# Patient Record
Sex: MALE | Race: WHITE | Employment: FULL TIME | URBAN - METROPOLITAN AREA
[De-identification: names, ages, dates, MRNs, and addresses within clinical notes are randomized per-mention and may not be internally consistent; named-entity substitution may affect disease eponyms.]

---

## 2018-08-25 ENCOUNTER — HOSPITAL ENCOUNTER (EMERGENCY)
Age: 38
Discharge: HOME | End: 2018-08-25
Attending: EMERGENCY MEDICINE
Payer: COMMERCIAL

## 2018-08-25 ENCOUNTER — APPOINTMENT (OUTPATIENT)
Dept: GENERAL RADIOLOGY | Age: 38
End: 2018-08-25
Attending: NURSE PRACTITIONER
Payer: COMMERCIAL

## 2018-08-25 VITALS
TEMPERATURE: 98 F | OXYGEN SATURATION: 97 % | WEIGHT: 175 LBS | HEART RATE: 101 BPM | SYSTOLIC BLOOD PRESSURE: 151 MMHG | BODY MASS INDEX: 25.05 KG/M2 | RESPIRATION RATE: 16 BRPM | DIASTOLIC BLOOD PRESSURE: 71 MMHG | HEIGHT: 70 IN

## 2018-08-25 DIAGNOSIS — S61.213A LACERATION OF LEFT MIDDLE FINGER WITHOUT FOREIGN BODY WITHOUT DAMAGE TO NAIL, INITIAL ENCOUNTER: ICD-10-CM

## 2018-08-25 DIAGNOSIS — S61.211A LACERATION OF LEFT INDEX FINGER WITHOUT FOREIGN BODY WITHOUT DAMAGE TO NAIL, INITIAL ENCOUNTER: Primary | ICD-10-CM

## 2018-08-25 PROCEDURE — 90715 TDAP VACCINE 7 YRS/> IM: CPT | Performed by: NURSE PRACTITIONER

## 2018-08-25 PROCEDURE — 77030018836 HC SOL IRR NACL ICUM -A

## 2018-08-25 PROCEDURE — 73130 X-RAY EXAM OF HAND: CPT

## 2018-08-25 PROCEDURE — 75810000293 HC SIMP/SUPERF WND  RPR

## 2018-08-25 PROCEDURE — 99283 EMERGENCY DEPT VISIT LOW MDM: CPT

## 2018-08-25 PROCEDURE — 77030008304 HC SPLNT FNGR ALUM DERY -A

## 2018-08-25 PROCEDURE — 90471 IMMUNIZATION ADMIN: CPT

## 2018-08-25 PROCEDURE — 74011250636 HC RX REV CODE- 250/636: Performed by: NURSE PRACTITIONER

## 2018-08-25 PROCEDURE — 74011250637 HC RX REV CODE- 250/637: Performed by: NURSE PRACTITIONER

## 2018-08-25 PROCEDURE — 74011000250 HC RX REV CODE- 250: Performed by: NURSE PRACTITIONER

## 2018-08-25 PROCEDURE — 77030031132 HC SUT NYL COVD -A

## 2018-08-25 RX ORDER — IBUPROFEN 400 MG/1
800 TABLET ORAL
Status: COMPLETED | OUTPATIENT
Start: 2018-08-25 | End: 2018-08-25

## 2018-08-25 RX ORDER — BUPIVACAINE HYDROCHLORIDE 5 MG/ML
10 INJECTION, SOLUTION EPIDURAL; INTRACAUDAL
Status: COMPLETED | OUTPATIENT
Start: 2018-08-25 | End: 2018-08-25

## 2018-08-25 RX ORDER — CIPROFLOXACIN 500 MG/1
250 TABLET ORAL 2 TIMES DAILY
COMMUNITY
End: 2018-11-05

## 2018-08-25 RX ORDER — IBUPROFEN 600 MG/1
600 TABLET ORAL
Qty: 20 TAB | Refills: 0 | Status: SHIPPED | OUTPATIENT
Start: 2018-08-25 | End: 2018-11-05

## 2018-08-25 RX ORDER — LIDOCAINE HYDROCHLORIDE 10 MG/ML
20 INJECTION INFILTRATION; PERINEURAL ONCE
Status: COMPLETED | OUTPATIENT
Start: 2018-08-25 | End: 2018-08-25

## 2018-08-25 RX ORDER — MOXIFLOXACIN HYDROCHLORIDE 400 MG/1
400 TABLET ORAL DAILY
COMMUNITY
End: 2018-11-05

## 2018-08-25 RX ORDER — ACETAMINOPHEN 500 MG
1000 TABLET ORAL
Qty: 20 TAB | Refills: 0 | Status: SHIPPED | OUTPATIENT
Start: 2018-08-25 | End: 2018-11-05

## 2018-08-25 RX ADMIN — LIDOCAINE HYDROCHLORIDE 2 ML: 10 INJECTION, SOLUTION INFILTRATION; PERINEURAL at 19:21

## 2018-08-25 RX ADMIN — IBUPROFEN 800 MG: 400 TABLET, FILM COATED ORAL at 17:33

## 2018-08-25 RX ADMIN — BACITRACIN ZINC, NEOMYCIN SULFATE, POLYMYXIN B SULFATE 1 PACKET: 3.5; 5000; 4 OINTMENT TOPICAL at 18:29

## 2018-08-25 RX ADMIN — BUPIVACAINE HYDROCHLORIDE 50 MG: 5 INJECTION, SOLUTION EPIDURAL; INTRACAUDAL at 18:00

## 2018-08-25 RX ADMIN — TETANUS TOXOID, REDUCED DIPHTHERIA TOXOID AND ACELLULAR PERTUSSIS VACCINE, ADSORBED 0.5 ML: 5; 2.5; 8; 8; 2.5 SUSPENSION INTRAMUSCULAR at 17:33

## 2018-08-25 NOTE — ED NOTES
patient presents to ED with c/o left hand, 2nd digit pain after cutting it with electrical saw. Bleeding controlled with dressing applied. tdap not up to date. patient also was seen in [facility] ED yesterday for abdominal pain; started taking Moxifloxacin and Ciprofloxacin yesterday. Emergency Department Nursing Plan of Care       The Nursing Plan of Care is developed from the Nursing assessment and Emergency Department Attending provider initial evaluation. The plan of care may be reviewed in the ED Provider note.     The Plan of Care was developed with the following considerations:   Patient / Family readiness to learn indicated by:verbalized understanding  Persons(s) to be included in education: patient  Barriers to Learning/Limitations:No    Signed     Perri Stubbs RN    8/25/2018   5:34 PM

## 2018-08-25 NOTE — DISCHARGE INSTRUCTIONS

## 2018-08-25 NOTE — ED NOTES
Discharge instructions were given to the patient by provider and Edith Edward RN. The patient left the Emergency Department ambulatory, alert and oriented and in no acute distress with 2 prescriptions. The patient was encouraged to call or return to the ED for worsening issues or problems and was encouraged to schedule a follow up appointment for continuing care. The patient verbalized understanding of discharge instructions and prescriptions, all questions were answered. The patient has no further concerns at this time.

## 2018-08-25 NOTE — ED NOTES
Pt educated that sutures need to be removed in 7-10 days. Pt educated on home care and given extra wound care supplies to take home. Pt verbalizes understanding of instructions. Pt given shirt from the donated clothing since his was covered in blood.

## 2018-08-25 NOTE — ED NOTES
Bedside and Verbal shift change report given to Macario Melissa RN (oncoming nurse) by Yancy Louise (offgoing nurse). Report included the following information SBAR.

## 2018-08-26 NOTE — ED PROVIDER NOTES
EMERGENCY DEPARTMENT HISTORY AND PHYSICAL EXAM    Date: 8/25/2018  Patient Name: Ceci Singletary    History of Presenting Illness     Chief Complaint   Patient presents with    Laceration     Pt sts was using and electric saw and it bounce back cutting   2nd and 3rd finger. + bleeding          History Provided By: Patient    Chief Complaint: laceration  Duration: 30 Minutes  Timing:  Acute  Location: left second third fingers  Quality: Aching and Sharp  Severity: 5 out of 10  Modifying Factors: moving fingers worsens pain  Associated Symptoms: denies any other associated signs or symptoms      HPI: Ceci Singletary is a 43 y.o. male with a PMH of No significant past medical history who presents with finger lacerations left second third finger occurred today after cutting fingers on electric saw. Patient states he was wearing protective gloves. Tetanus not up to date    PCP: Aguilar Gallegos MD    Current Outpatient Prescriptions   Medication Sig Dispense Refill    moxifloxacin (AVELOX) 400 mg tablet Take 400 mg by mouth daily.  ciprofloxacin HCl (CIPRO) 500 mg tablet Take 250 mg by mouth two (2) times a day.  ibuprofen (MOTRIN) 600 mg tablet Take 1 Tab by mouth every six (6) hours as needed for Pain. 20 Tab 0    acetaminophen (TYLENOL EXTRA STRENGTH) 500 mg tablet Take 2 Tabs by mouth every six (6) hours as needed for Pain. 20 Tab 0    melatonin 5 mg tab Take  by mouth nightly as needed.  omega-3 fatty acids-vitamin e (FISH OIL) 1,000 mg cap Take 1 capsule by mouth.  alprazolam (XANAX) 1 mg tablet Take 1 tablet by mouth nightly as needed for Sleep (30). 30 tablet 3       Past History     Past Medical History:  History reviewed. No pertinent past medical history. Past Surgical History:  Past Surgical History:   Procedure Laterality Date    HX HERNIA REPAIR      when he was younger       Family History:  History reviewed. No pertinent family history.     Social History:  Social History Substance Use Topics    Smoking status: Never Smoker    Smokeless tobacco: Never Used    Alcohol use No       Allergies:  No Known Allergies      Review of Systems   Review of Systems   Constitutional: Negative for chills, fatigue and fever. Respiratory: Negative for shortness of breath. Cardiovascular: Negative for chest pain. Gastrointestinal: Negative for abdominal pain. Musculoskeletal: Negative for arthralgias, back pain, myalgias, neck pain and neck stiffness. Skin: Positive for wound. Negative for rash. Neurological: Negative for dizziness, light-headedness, numbness and headaches. All other systems reviewed and are negative. Physical Exam     Vitals:    08/25/18 1653   BP: 151/71   Pulse: (!) 101   Resp: 16   Temp: 98 °F (36.7 °C)   SpO2: 97%   Weight: 79.4 kg (175 lb)   Height: 5' 10\" (1.778 m)     Physical Exam   Constitutional: He is oriented to person, place, and time. He appears well-developed and well-nourished. HENT:   Head: Normocephalic and atraumatic. Right Ear: External ear normal.   Mouth/Throat: Oropharynx is clear and moist.   Eyes: Conjunctivae are normal. Right eye exhibits no discharge. Left eye exhibits no discharge. Neck: Normal range of motion. Neck supple. Cardiovascular: Normal rate, regular rhythm and normal heart sounds. Pulmonary/Chest: Effort normal and breath sounds normal. No respiratory distress. He has no wheezes. Abdominal: Soft. Bowel sounds are normal. There is no tenderness. Musculoskeletal: Normal range of motion. He exhibits no edema. Hands:  One cm laceration second and third fingers bleeding controlled DNV intact    Lymphadenopathy:     He has no cervical adenopathy. Neurological: He is alert and oriented to person, place, and time. No cranial nerve deficit. Skin: Skin is warm and dry. Psychiatric: He has a normal mood and affect.  His behavior is normal. Judgment and thought content normal.   Nursing note and vitals reviewed. Diagnostic Study Results     Labs -   No results found for this or any previous visit (from the past 12 hour(s)). Radiologic Studies -   XR HAND LT MIN 3 V   Final Result        CT Results  (Last 48 hours)    None        CXR Results  (Last 48 hours)    None            Medical Decision Making   I am the first provider for this patient. I reviewed the vital signs, available nursing notes, past medical history, past surgical history, family history and social history. Vital Signs-Reviewed the patient's vital signs. Records Reviewed: Nursing Notes    ED Course:   stable  Disposition:  home    DISCHARGE NOTE:         Care plan outlined and precautions discussed. Patient has no new complaints, changes, or physical findings. Results of xray were reviewed with the patient. All medications were reviewed with the patient; will d/c home take motrin tylenol for pain. All of pt's questions and concerns were addressed. Patient was instructed and agrees to follow up with PCP sutures out in ten days. , as well as to return to the ED upon further deterioration. Patient is ready to go home. Follow-up Information     Follow up With Details Comments Contact Info      For suture removal     Emory Davis MD In 2 days If symptoms worsen [facility]  [phone]            Discharge Medication List as of 8/25/2018  7:38 PM      CONTINUE these medications which have NOT CHANGED    Details   moxifloxacin (AVELOX) 400 mg tablet Take 400 mg by mouth daily. , Historical Med      ciprofloxacin HCl (CIPRO) 500 mg tablet Take 250 mg by mouth two (2) times a day., Historical Med      melatonin 5 mg tab Take  by mouth nightly as needed., Historical Med      omega-3 fatty acids-vitamin e (FISH OIL) 1,000 mg cap Take 1 capsule by mouth., Historical Med      alprazolam (XANAX) 1 mg tablet Take 1 tablet by mouth nightly as needed for Sleep (30). , Print, Disp-30 tablet, R-3             Provider Notes (Medical Decision Making):   DDX laceration tendon injury fracture  Procedures:  Wound Repair  Date/Time: 8/25/2018 6:45 PM  Performed by: NPSupervising provider: dr sadler  Preparation: skin prepped with Betadine  Pre-procedure re-eval: Immediately prior to the procedure, the patient was reevaluated and found suitable for the planned procedure and any planned medications. Time out: Immediately prior to the procedure a time out was called to verify the correct patient, procedure, equipment, staff and marking as appropriate. .  Location: left second third fingers one cm each linear lacerations. Wound length:2.5 cm or less (one cm second finger one cm third finger left hand)  Anesthesia: digital block    Anesthesia:  Local Anesthetic: bupivacaine 0.25% without epinephrine  Anesthetic total: 8 mL  Foreign bodies: no foreign bodies  Irrigation solution: saline  Irrigation method: syringe  Debridement: minimal  Skin closure: 5-0 nylon  Number of sutures: 6 (2 sutres second finger 4 sutures third finger)  Technique: simple and interrupted  Approximation: close  Dressing: gauze roll and antibiotic ointment  Patient tolerance: Patient tolerated the procedure well with no immediate complications  My total time at bedside, performing this procedure was 16-30 minutes. Diagnosis     Clinical Impression:   1. Laceration of left index finger without foreign body without damage to nail, initial encounter    2.  Laceration of left middle finger without foreign body without damage to nail, initial encounter

## 2018-11-05 ENCOUNTER — HOSPITAL ENCOUNTER (EMERGENCY)
Age: 38
Discharge: HOME | End: 2018-11-05
Attending: EMERGENCY MEDICINE
Payer: COMMERCIAL

## 2018-11-05 VITALS
SYSTOLIC BLOOD PRESSURE: 130 MMHG | BODY MASS INDEX: 25.4 KG/M2 | RESPIRATION RATE: 18 BRPM | OXYGEN SATURATION: 99 % | WEIGHT: 171.52 LBS | DIASTOLIC BLOOD PRESSURE: 80 MMHG | HEIGHT: 69 IN | TEMPERATURE: 97.5 F | HEART RATE: 74 BPM

## 2018-11-05 DIAGNOSIS — H57.9 SENSATION OF FOREIGN BODY IN EYE: Primary | ICD-10-CM

## 2018-11-05 PROCEDURE — 74011000250 HC RX REV CODE- 250: Performed by: EMERGENCY MEDICINE

## 2018-11-05 PROCEDURE — 99284 EMERGENCY DEPT VISIT MOD MDM: CPT

## 2018-11-05 PROCEDURE — 77030013733

## 2018-11-05 RX ORDER — POLYMYXIN B SULFATE AND TRIMETHOPRIM 1; 10000 MG/ML; [USP'U]/ML
1 SOLUTION OPHTHALMIC 2 TIMES DAILY
Status: DISCONTINUED | OUTPATIENT
Start: 2018-11-05 | End: 2018-11-05

## 2018-11-05 RX ORDER — POLYMYXIN B SULFATE AND TRIMETHOPRIM 1; 10000 MG/ML; [USP'U]/ML
1 SOLUTION OPHTHALMIC 2 TIMES DAILY
Status: DISCONTINUED | OUTPATIENT
Start: 2018-11-05 | End: 2018-11-05 | Stop reason: HOSPADM

## 2018-11-05 RX ORDER — TETRACAINE HYDROCHLORIDE 5 MG/ML
1 SOLUTION OPHTHALMIC
Status: COMPLETED | OUTPATIENT
Start: 2018-11-05 | End: 2018-11-05

## 2018-11-05 RX ORDER — BACITRACIN 500 [USP'U]/G
OINTMENT OPHTHALMIC
Qty: 1 TUBE | Refills: 0 | Status: SHIPPED | OUTPATIENT
Start: 2018-11-05 | End: 2019-06-07

## 2018-11-05 RX ORDER — BACITRACIN 500 [USP'U]/G
OINTMENT OPHTHALMIC
Qty: 1 TUBE | Refills: 0 | Status: SHIPPED | OUTPATIENT
Start: 2018-11-05 | End: 2018-11-05

## 2018-11-05 RX ADMIN — TETRACAINE HYDROCHLORIDE 1 DROP: 5 SOLUTION OPHTHALMIC at 01:01

## 2018-11-05 RX ADMIN — POLYMYXIN B SULFATE AND TRIMETHOPRIM SULFATE 1 DROP: 10000; 1 SOLUTION/ DROPS OPHTHALMIC at 01:36

## 2018-11-05 RX ADMIN — FLUORESCEIN SODIUM 1 STRIP: 1 STRIP OPHTHALMIC at 01:01

## 2018-11-05 NOTE — ED NOTES
Called pharmacy to have trimethoprim drops tubed to ED. Pt states his eye still hurts. Flushed pt eye with saline flush.

## 2018-11-05 NOTE — ED PROVIDER NOTES
EMERGENCY DEPARTMENT HISTORY AND PHYSICAL EXAM 
 
 
Date: 11/5/2018 Patient Name: Ledy Hinton History of Presenting Illness Chief Complaint Patient presents with  Eye Pain History Provided By: Patient HPI: Ledy Hinton is a 43 y.o. male, who presents ambulatory to the ED c/o persistent sore, right eye pain x yesterday evening. Pt states he was been cutting wood outside, and while cleaning up the dust, he felt something fly into his eye. He reports attempting to rinse his eye at home, without any relief of the pain. He denies any changes in his vision. He denies taking any medications to modify his pain. Pt specifically denies any recent fever, chills, nausea, vomiting, diarrhea, abd pain, CP, SOB, lightheadedness, dizziness, numbness, weakness, tingling, BLE swelling, HA, heart palpitations, urinary sxs, changes in BM, changes in PO intake, melena, hematochezia, cough, or congestion. PCP: Ti Kwong MD 
 
PMHx: Significant for none PSHx: Significant for hernia repair Social Hx: -tobacco, -EtOH, -Illicit Drugs There are no other complaints, changes, or physical findings at this time. Current Facility-Administered Medications Medication Dose Route Frequency Provider Last Rate Last Dose  trimethoprim-polymyxin b (POLYTRIM) 10,000 unit- 1 mg/mL ophthalmic solution 1 Drop  1 Drop Right Eye BID Lyndsay Cheema MD      
 
Current Outpatient Medications Medication Sig Dispense Refill  bacitracin ophthalmic ointment Apply thin ribbon to Right eye three times a day 1 Tube 0 Past History Past Medical History: 
History reviewed. No pertinent past medical history. Past Surgical History: 
Past Surgical History:  
Procedure Laterality Date  HX HERNIA REPAIR    
 when he was younger Family History: 
History reviewed. No pertinent family history. Social History: 
Social History Tobacco Use  Smoking status: Never Smoker  Smokeless tobacco: Never Used Substance Use Topics  Alcohol use: No  
 Drug use: No  
 
 
Allergies: 
No Known Allergies Review of Systems Review of Systems Constitutional: Negative for chills and fever. HENT: Negative for congestion, ear pain, rhinorrhea and sore throat. Eyes: Positive for pain. Respiratory: Negative for cough and shortness of breath. Cardiovascular: Negative for chest pain, palpitations and leg swelling. Gastrointestinal: Negative for abdominal pain, constipation, diarrhea, nausea and vomiting. No melena No hematochezia Endocrine: Negative for polyuria. Genitourinary: Negative for dysuria, frequency and hematuria. Neurological: Negative for dizziness, weakness, light-headedness, numbness and headaches. No tingling All other systems reviewed and are negative. Physical Exam  
Physical Exam  
Nursing note and vitals reviewed. General appearance - well nourished, well appearing, and in no distress Eyes - pupils equal and reactive, extraocular eye movements intact ENT - mucous membranes moist, pharynx normal without lesions Neck - supple, no significant adenopathy; non-tender to palpation Chest - clear to auscultation, no wheezes, rales or rhonchi; non-tender to palpation Heart - normal rate and regular rhythm, S1 and S2 normal, no murmurs noted Abdomen - soft, nontender, nondistended, no masses or organomegaly Musculoskeletal - no joint tenderness, deformity or swelling; normal ROM Extremities - peripheral pulses normal, no pedal edema Skin - normal coloration and turgor, no rashes Neurological - alert, oriented x3, normal speech, no focal findings or movement disorder noted Written by Brittany Noble ED Scribe, as dictated by Quinn Armstrong MD 
 
Diagnostic Study Results Labs - No results found for this or any previous visit (from the past 12 hour(s)). Medical Decision Making I am the first provider for this patient. I reviewed the vital signs, available nursing notes, past medical history, past surgical history, family history and social history. Vital Signs-Reviewed the patient's vital signs. Patient Vitals for the past 12 hrs: 
 Temp Pulse Resp BP SpO2  
11/05/18 0008 97.5 °F (36.4 °C) 74 18 133/90 99 % Pulse Oximetry Analysis - 99% on RA Cardiac Monitor:  
Rate: 74bpm 
Rhythm: Normal Sinus Rhythm Records Reviewed: Nursing Notes and Old Medical Records Provider Notes (Medical Decision Making): DDx: foreign body, corneal abrasion, conjunctivitis ED Course:  
Initial assessment performed. The patients presenting problems have been discussed, and they are in agreement with the care plan formulated and outlined with them. I have encouraged them to ask questions as they arise throughout their visit. Procedure Note - Wood's lamp exam: 
12:26 AM 
Performed by: Joe Rivera MD 
Pts right eye was anesthetized with tetracaine, stained with fluorescein, and examined with a Wood's lamp, using lid eversion. Foreign body: no 
Fluorescein uptake: no; conjunctiva markedly ejected, no foreign body seen. The procedure took 1-15 minutes, and pt tolerated well. Progress note: 
1:24 AM 
Pt noted to be feeling better, ready for discharge. Discharge with antibiotic eye drops. Will follow up as instructed. All questions have been answered, pt voiced understanding and agreement with plan. Specific return precautions provided as well as instructions to return to the ED should sx worsen at any time. Vital signs stable for discharge. Written by Mary Kate Rivera ED Scribe, as dictated by Lyndsay Cheema MD 
 
Critical Care Time:  
 
none Disposition: 
 
Discharge Note: 
1:25 AM 
The pt is ready for discharge.  The pt's signs, symptoms, diagnosis, and discharge instructions have been discussed and pt has conveyed their understanding. The pt is to follow up as recommended or return to ER should their symptoms worsen. Plan has been discussed and pt is in agreement. PLAN: 
1. Current Discharge Medication List  
  
START taking these medications Details  
bacitracin ophthalmic ointment Apply thin ribbon to Right eye three times a day 
Qty: 1 Tube, Refills: 0  
  
  
 
2. Follow-up Information Follow up With Specialties Details Why Contact Info Jimmy Saul MD Ophthalmology Schedule an appointment as soon as possible for a visit  [facility] [address] [facility] [place] [address] 
[phone] [facility] EMERGENCY DEPT Emergency Medicine  If symptoms worsen [address] [facility] 
[phone] Return to ED if worse Diagnosis Clinical Impression: 1. Sensation of foreign body in eye Attestations: This note is prepared by Mary Kate Rivera, acting as Scribe for MD Lyndsay Kruse MD : The scribe's documentation has been prepared under my direction and personally reviewed by me in its entirety. I confirm that the note above accurately reflects all work, treatment, procedures, and medical decision making performed by me. This note will not be viewable in [address]

## 2018-11-05 NOTE — ED TRIAGE NOTES
The patient arrives to triage, ambulatory with a steady gait. The patient reports \"cutting some wood, and I was done cutting and I was cleaning up, and I was using the broom and something went into the air and into my eye\". Reports trying to rinse in out without success. Patient reports pain and swelling.

## 2018-11-05 NOTE — DISCHARGE INSTRUCTIONS
Feeling of an Object in the Eye: Care Instructions  Your Care Instructions    Sometimes people feel like there is something in their eye. This is called a foreign body sensation. A doctor may not find anything wrong with your eye. If you had something very small in your eye, like a speck of dirt, tears may have washed it out. Or you may have a small scratch on the surface of the eye (cornea), which can make it feel as if something is still in your eye. The doctor will check your vision and examine your eye. Your eye may be numbed with drops. Sometimes a drop of colored fluid is put in the eye. This lets the doctor have a better view of the surface of the eye. You may get drops to put in your eye after you go home. Or you may just need to watch for a change in your symptoms. Follow-up care is a key part of your treatment and safety. Be sure to make and go to all appointments, and call your doctor if you are having problems. It's also a good idea to know your test results and keep a list of the medicines you take. How can you care for yourself at home? · Do not rub your eye. · If the doctor prescribed eyedrops or ointment, use them as directed. Be sure the dropper or bottle tip is clean. · To put in eyedrops or ointment:  ? Tilt your head back, and pull your lower eyelid down with one finger. ? Drop or squirt the medicine inside the lower lid. ? Close your eye for 30 to 60 seconds to let the drops or ointment move around. ? Do not touch the ointment or dropper tip to your eyelashes or any other surface. When should you call for help? Call your doctor now or seek immediate medical care if:    · You have new or worse eye pain.     · Light hurts your eye.     · You have new or worse redness in your eye.     · You have symptoms of an eye infection, such as:  ? Pus or thick discharge coming from the eye.  ? Redness or swelling around the eye.  ?  A fever.     · You have vision changes.    Watch closely for changes in your health, and be sure to contact your doctor if:    · You do not get better as expected. Where can you learn more? Go to http://kaye-kae.info/. Enter U597 in the search box to learn more about \"Feeling of an Object in the Eye: Care Instructions. \"  Current as of: November 20, 2017  Content Version: 11.8  © 8375-3067 [employer]. Care instructions adapted under license by [employer] (which disclaims liability or warranty for this information). If you have questions about a medical condition or this instruction, always ask your healthcare professional. [place] [address] any warranty or liability for your use of this information.

## 2018-11-05 NOTE — ED NOTES
Dr. Danni Smyth reviewed discharge instructions with the patient. The patient verbalized understanding. All questions and concerns were addressed. The patient declined a wheelchair and is discharged ambulatory in the care of family members with instructions and prescriptions in hand. Pt is alert and oriented x 4. Respirations are clear and unlabored.

## 2019-05-29 ENCOUNTER — TELEPHONE (OUTPATIENT)
Dept: FAMILY MEDICINE CLINIC | Age: 39
End: 2019-05-29

## 2019-06-07 ENCOUNTER — OFFICE VISIT (OUTPATIENT)
Dept: FAMILY MEDICINE CLINIC | Age: 39
End: 2019-06-07

## 2019-06-07 VITALS
TEMPERATURE: 97.2 F | DIASTOLIC BLOOD PRESSURE: 64 MMHG | WEIGHT: 167.2 LBS | RESPIRATION RATE: 14 BRPM | HEART RATE: 70 BPM | SYSTOLIC BLOOD PRESSURE: 106 MMHG | BODY MASS INDEX: 24.76 KG/M2 | OXYGEN SATURATION: 98 % | HEIGHT: 69 IN

## 2019-06-07 DIAGNOSIS — F41.9 ANXIETY: ICD-10-CM

## 2019-06-07 DIAGNOSIS — Z00.00 ENCOUNTER FOR ANNUAL PHYSICAL EXAM: Primary | ICD-10-CM

## 2019-06-07 DIAGNOSIS — D33.4 SCHWANNOMA OF SPINAL CORD (HCC): ICD-10-CM

## 2019-06-07 RX ORDER — VENLAFAXINE HYDROCHLORIDE 37.5 MG/1
37.5 CAPSULE, EXTENDED RELEASE ORAL DAILY
Qty: 30 CAP | Refills: 1 | Status: SHIPPED | OUTPATIENT
Start: 2019-06-07 | End: 2021-03-18 | Stop reason: ALTCHOICE

## 2019-06-07 RX ORDER — ERYTHROMYCIN 5 MG/G
OINTMENT OPHTHALMIC
COMMUNITY
Start: 2019-03-20 | End: 2019-06-07

## 2019-06-07 NOTE — PROGRESS NOTES
Chief Complaint   Patient presents with   [address] [place] [address]     new patient     1. Have you been to the ER, urgent care clinic since your last visit? Hospitalized since your last visit? No  Po[facility]  2 month [facility]    2. Have you seen or consulted any other health care providers outside of the [address] [place] [address] since your last visit? Include any pap smears or colon screening. No     There are no preventive care reminders to display for this patient.

## 2019-06-07 NOTE — PROGRESS NOTES
HISTORY OF PRESENT ILLNESS  Wayne Ledbetter is a 43 y.o. male. HPI  The patient presents today to establish care. Previous PCP was Dr. Jean Cohen. Last office visit several years ago. PMHx is significant for Schwannoma (2014), diverticulosis, anxiety. PSHx is significant for bilateral inguinal hernia repair (1980s), Schwannoma removal (2014). FHx is significant for hyperthyroidism, COPD, bladder cancer. Works as a . Originally from [place], [place]. Schwannoma - found incidentally when he had appendicitis. Sees neurosurgery at [facility] annually for surveillance MRIs; has not been since summer 2018. Had surgery to remove it in 2014. Isn't sure if he plans to go back, as he doesn't want to spend $2000 on the MRIs and it was benign. Currently asymptomatic. Diverticulosis - Had a mild case of diverticulitis in 2018, had a colonoscopy which was normal. Now cutting back on seeds, spicy foods, etc. No further issues. Left inguinal hernia - Reports a small inguinal hernia. Saw surgery in early 2019; plan is for surveillance for now, as it is not currently causing any issues. Current Specialists:  1. Neurosurgery at [facility] - last visit summer 2018, f/u 1 year     Acute complaints today:  1. He reports that he has suffered with anxiety for many years. 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 He is a strong type-A personality, so he feels bad when he is not being fully productive. He has been having difficulty focusing at work, which is problematic, and he'll do something and won't remember it. He will try to overcompensate with laughter and joking, which causes relationship issues. He reports that he has panic attacks; for him, this looks like \"I just shut down and don't want to talk to anyone. \" He thinks that he gets depressed from time to time but not all the time.  He reports that he goes to 0 to 100 very quickly, and will get agitated quickly. He has dealt with this for many years. Now he smokes marijuana and exercises to deal with his anxiety. LINDA score of 16 today. Preventative Care  TDaP: August 2018  Mammogram: has never had  Colonoscopy: 2018  Denies family history of breast cancer in men or early colon CA. Healthy Habits  Patient is exercising several times per week, doing both weight training and cardio. Patient endorses healthy diet; avoids fatty/greasy foods, sugar-sweetened beverages. Denies tobacco use. Social alcohol use. Denies illicit drug use. Smokes marijuana frequently to deal with his anxiety. Sexually active with 3 female partners in last 12 months. Uses condoms for protection; denies concerns for STIs today and declines screening today. He reports that he gets tested regularly. Past Medical History:   Diagnosis Date    Schwannoma of spinal cord ([facility] [address] 2014     Past Surgical History:   Procedure Laterality Date    HX HERNIA REPAIR  [id number]    inguinal hernia repairs (bilateral)    NEUROLOGICAL PROCEDURE UNLISTED  03/2015    Schwanoma removal behind bladder     Family History   Problem Relation Age of Onset    Thyroid Disease Mother         hyperthyroidism    COPD Mother     No Known Problems Father     Cancer Maternal Grandmother         bladder cancer     Social History     Socioeconomic History    Marital status: SINGLE     Spouse name: Not on file    Number of children: Not on file    Years of education: Not on file    Highest education level: Not on file   Tobacco Use    Smoking status: Never Smoker    Smokeless tobacco: Never Used   Substance and Sexual Activity    Alcohol use: Yes     Comment: social    Drug use: Yes     Frequency: 3.0 times per week     Types: Marijuana    Sexual activity: Yes     Partners: Female     Birth control/protection: Condom   Social History Narrative    Engaged, 2 children, 19 and 11. Oldest goes to college in [place]. Works for [address] [place] [address] as a      Patient Active Problem List   Diagnosis Code    Anxiety F41.9    [facility] D33.4     Outpatient Encounter Medications as of 6/7/2019   Medication Sig Dispense Refill    venlafaxine-SR (EFFEXOR-XR) 37.5 mg capsule Take 1 Cap by mouth daily. 30 Cap 1    [DISCONTINUED] erythromycin (ILOTYCIN) ophthalmic ointment       [DISCONTINUED] bacitracin ophthalmic ointment Apply thin ribbon to Right eye three times a day 1 Tube 0     No facility-administered encounter medications on file as of 6/7/2019.       Visit Vitals  /64   Pulse 70   Temp 97.2 °F (36.2 °C) (Oral)   Resp 14   Ht 5' 9\" (1.753 m)   Wt 167 lb 3.2 oz (75.8 kg)   SpO2 98%   BMI 24.69 kg/m²         ROS  Constitutional: denies fevers, chills, fatigue, unintentional weight loss  Skin: denies rashes, itching, concerning/changing lesions  HENT: denies ear pain, hearing loss, sore throat, congestion  Eye: denies eye pain, blurred/double vision, eye discharge/redness  Cardiovascular: denies chest pain, palpitations, peripheral edema, orthopnea, claudication  Pulmonary: denies cough, shortness of breath, wheezing  GI: denies heartburn, nausea, vomiting, diarrhea, constipation, rectal bleeding, abdominal pain  : denies dysuria, hematuria  MS: denies frequent/unexplained falls, persistent myalgias  Neuro: denies headaches, lightheadedness, numbness/tingling, seizures, sensory/strength changes  Psychiatry: denies depression,  insomnia, memory loss; + anxiety    Physical Exam  Visit Vitals  /64   Pulse 70   Temp 97.2 °F (36.2 °C) (Oral)   Resp 14   Ht 5' 9\" (1.753 m)   Wt 167 lb 3.2 oz (75.8 kg)   SpO2 98%   BMI 24.69 kg/m²     Constitutional: alert, oriented, no acute distress  HENT: normocephalic, atraumatic; ears normal bilaterally; nose normal; oropharynx clear and moist  Eyes: PERRL, EOM intact, conjunctivae normal, no discharge  Neck: no thyromegaly, no cervical, submandibular, submental, occipital, supraclavicular lymphadenopathy  Cardiovascular: normal rate, regular rhythm, no murmurs noted; radial, femoral, and pedal pulses normal bilaterally  Pulmonary: lungs clear to auscultation bilaterally with no wheezing, rhonchi, or increased work of breathing  Abdomen: soft, non-tender; + bowel sounds; no distension, masses, or organomegaly noted  : normal testicular and penile exam; no inguinal lymphadenopathy; + very small left inguinal hernia, non-incarcerated  MSK: normal gait, full ROM of all extremities  Neurological: alert, oriented, CN II - XII grossly intact; DTRs 2+ bilaterally  Skin: warm, dry, no rashes or suspicious lesions noted  Psych: mood/affect normal, behavior normal      ASSESSMENT and PLAN    ICD-10-CM ICD-9-CM    1. Encounter for annual physical exam Z00.00 V70.0 CBC W/O DIFF      METABOLIC PANEL, COMPREHENSIVE      LIPID PANEL AND CHOL/HDL RATIO      URINALYSIS W/ RFLX MICROSCOPIC      TSH RFX ON ABNORMAL TO FREE T4   2. Anxiety F41.9 300.00 venlafaxine-SR (EFFEXOR-XR) 37.5 mg capsule      REFERRAL TO PSYCHOLOGY   3. Schwannoma of spinal cord (HCC) D33.4 225.3      1. Establish care - The patient's medications, allergies, and history were all updated today. Will request records from [facility] neurosurgery. 2. CPE - Normal CPE today with exception of very mild left inguinal hernia. Agree with watchful waiting approach. Check basic labs today and discuss at follow up visit. 3. Anxiety - LINDA score of 16 today. We discussed therapeutic options, and he elects to trial effexor 37.5mg daily and see a counselor. The medicatoin was prescribed, timeline during which to expect improvement in symptoms was discussed, and he was referred to psychology with a list of possible counselors and encouraged to schedule his own appointment at his earliest convenience.  We did discuss that neither medication nor counseling would lessen the stresses of his life, which sounds very full, but could help him develop priorities. He agrees with this. 4. Schwannoma - Will pull records from [facility] neurosurgery and discuss follow up at his next visit. Return in 4 - 6 weeks for medication follow-up, sooner PRN. Follow-up and Dispositions    · Return in about 6 weeks (around 7/19/2019) for lab/med follow up.

## 2019-06-08 LAB
ALBUMIN SERPL-MCNC: 5.1 G/DL (ref 3.5–5.5)
ALBUMIN/GLOB SERPL: 2.1 {RATIO} (ref 1.2–2.2)
ALP SERPL-CCNC: 55 IU/L (ref 39–117)
ALT SERPL-CCNC: 20 IU/L (ref 0–44)
APPEARANCE UR: CLEAR
AST SERPL-CCNC: 19 IU/L (ref 0–40)
BILIRUB SERPL-MCNC: 0.7 MG/DL (ref 0–1.2)
BILIRUB UR QL STRIP: NEGATIVE
BUN SERPL-MCNC: 12 MG/DL (ref 6–20)
BUN/CREAT SERPL: 15 (ref 9–20)
CALCIUM SERPL-MCNC: 9.7 MG/DL (ref 8.7–10.2)
CHLORIDE SERPL-SCNC: 101 MMOL/L (ref 96–106)
CHOLEST SERPL-MCNC: 172 MG/DL (ref 100–199)
CHOLEST/HDLC SERPL: 2.2 RATIO (ref 0–5)
CO2 SERPL-SCNC: 26 MMOL/L (ref 20–29)
COLOR UR: YELLOW
CREAT SERPL-MCNC: 0.82 MG/DL (ref 0.76–1.27)
ERYTHROCYTE [DISTWIDTH] IN BLOOD BY AUTOMATED COUNT: 13.3 % (ref 12.3–15.4)
GLOBULIN SER CALC-MCNC: 2.4 G/DL (ref 1.5–4.5)
GLUCOSE SERPL-MCNC: 75 MG/DL (ref 65–99)
GLUCOSE UR QL: NEGATIVE
HCT VFR BLD AUTO: 46 % (ref 37.5–51)
HDLC SERPL-MCNC: 79 MG/DL
HGB BLD-MCNC: 15.7 G/DL (ref 13–17.7)
HGB UR QL STRIP: NEGATIVE
INTERPRETATION, 910389: NORMAL
KETONES UR QL STRIP: NEGATIVE
LDLC SERPL CALC-MCNC: 85 MG/DL (ref 0–99)
LEUKOCYTE ESTERASE UR QL STRIP: NEGATIVE
MCH RBC QN AUTO: 31.2 PG (ref 26.6–33)
MCHC RBC AUTO-ENTMCNC: 34.1 G/DL (ref 31.5–35.7)
MCV RBC AUTO: 91 FL (ref 79–97)
MICRO URNS: NORMAL
NITRITE UR QL STRIP: NEGATIVE
PH UR STRIP: 7.5 [PH] (ref 5–7.5)
PLATELET # BLD AUTO: 230 X10E3/UL (ref 150–450)
POTASSIUM SERPL-SCNC: 4.3 MMOL/L (ref 3.5–5.2)
PROT SERPL-MCNC: 7.5 G/DL (ref 6–8.5)
PROT UR QL STRIP: NEGATIVE
RBC # BLD AUTO: 5.04 X10E6/UL (ref 4.14–5.8)
SODIUM SERPL-SCNC: 142 MMOL/L (ref 134–144)
SP GR UR: 1.02 (ref 1–1.03)
TRIGL SERPL-MCNC: 39 MG/DL (ref 0–149)
TSH SERPL DL<=0.005 MIU/L-ACNC: 1.15 UIU/ML (ref 0.45–4.5)
UROBILINOGEN UR STRIP-MCNC: 0.2 MG/DL (ref 0.2–1)
VLDLC SERPL CALC-MCNC: 8 MG/DL (ref 5–40)
WBC # BLD AUTO: 4 X10E3/UL (ref 3.4–10.8)

## 2019-07-22 ENCOUNTER — TELEPHONE (OUTPATIENT)
Dept: FAMILY MEDICINE CLINIC | Age: 39
End: 2019-07-22
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
## 2019-07-22 NOTE — TELEPHONE ENCOUNTER
Patient contacted to let know no availability at this time for 7/29. Patient suggested to check daily for possible cancellation or just reschedule to next available appointment.

## 2021-03-18 ENCOUNTER — TELEPHONE (OUTPATIENT)
Dept: FAMILY MEDICINE CLINIC | Age: 41
End: 2021-03-18

## 2021-03-18 ENCOUNTER — OFFICE VISIT (OUTPATIENT)
Dept: FAMILY MEDICINE CLINIC | Age: 41
End: 2021-03-18

## 2021-03-18 VITALS
DIASTOLIC BLOOD PRESSURE: 74 MMHG | WEIGHT: 175 LBS | OXYGEN SATURATION: 98 % | SYSTOLIC BLOOD PRESSURE: 120 MMHG | HEART RATE: 88 BPM | BODY MASS INDEX: 25.05 KG/M2 | RESPIRATION RATE: 14 BRPM | TEMPERATURE: 97.9 F | HEIGHT: 70 IN

## 2021-03-18 DIAGNOSIS — R10.30 LOWER ABDOMINAL PAIN: Primary | ICD-10-CM

## 2021-03-18 PROCEDURE — 99214 OFFICE O/P EST MOD 30 MIN: CPT | Performed by: STUDENT IN AN ORGANIZED HEALTH CARE EDUCATION/TRAINING PROGRAM

## 2021-03-18 RX ORDER — AMOXICILLIN AND CLAVULANATE POTASSIUM 875; 125 MG/1; MG/1
1 TABLET, FILM COATED ORAL 2 TIMES DAILY
Qty: 10 TAB | Refills: 0 | Status: SHIPPED | OUTPATIENT
Start: 2021-03-18 | End: 2021-03-23

## 2021-03-18 RX ORDER — POLYETHYLENE GLYCOL 3350 17 G/17G
17 POWDER, FOR SOLUTION ORAL
Qty: 30 EACH | Refills: 0 | Status: SHIPPED | OUTPATIENT
Start: 2021-03-18 | End: 2022-06-24

## 2021-03-18 NOTE — PROGRESS NOTES
[address] [address] SARITHA Chandra. [address]  [phone]    C/C: Lower Abdominal Pain     Sally Russell is a 38 y.o. male who presents with complaint of;    Abdominal pain:    Lower abd pain ongoing for about 4 days. Has been having small bowel movements but states that he would have to sit for a little longer. Stools are hard. The pain is along the left suprapubic region. Denies any  rectal bleeding, weight loss, dysphagia, early satiety, blood in stool, blood in the urine, flank pain, fever, chills, nausea, vomiting. Had a schwanoma s/p surgery in 2014. Also s/p appendectomy. PMHx is significant for Schwannoma (2014), diverticulosis, anxiety. PSHx is significant for bilateral inguinal hernia repair (1980s), Schwannoma removal (2014). Had a mild case of diverticulitis in 2018, had a colonoscopy which was normal. Was asked to cut back on seeds, spicy foods, etc.    Allergies- reviewed:   No Known Allergies    Medications- reviewed:   Current Outpatient Medications   Medication Sig    amoxicillin-clavulanate (AUGMENTIN) 875-125 mg per tablet Take 1 Tab by mouth two (2) times a day for 5 days. TAKE IF YOU START HAVING SYSTEMIC SYMPTOMS AS DISCUSSED IN CLINIC    polyethylene glycol (MIRALAX) 17 gram packet Take 1 Packet by mouth daily as needed for Constipation. STOP when you start having loose stools     No current facility-administered medications for this visit.         Past Medical History- reviewed:  Past Medical History:   Diagnosis Date    Schwannoma of spinal cord ([facility] [address] 2014       Family History - reviewed:  Family History   Problem Relation Age of Onset    Thyroid Disease Mother         hyperthyroidism    COPD Mother     No Known Problems Father     Cancer Maternal Grandmother         bladder cancer       Social History - reviewed:  Social History     Socioeconomic History    Marital status: SINGLE     Spouse name: Not on file    Number of children: Not on file    Years of education: Not on file    Highest education level: Not on file   Occupational History    Not on file   Social Needs    Financial resource strain: Not on file    Food insecurity     Worry: Not on file     Inability: Not on file    Transportation needs     Medical: Not on file     Non-medical: Not on file   Tobacco Use    Smoking status: Never Smoker    Smokeless tobacco: Never Used   Substance and Sexual Activity    Alcohol use: Yes     Comment: social    Drug use: Yes     Frequency: 3.0 times per week     Types: Marijuana    Sexual activity: Yes     Partners: Female     Birth control/protection: Condom   Lifestyle    Physical activity     Days per week: Not on file     Minutes per session: Not on file    Stress: Not on file   Relationships    Social connections     Talks on phone: Not on file     Gets together: Not on file     Attends Gnosticist service: Not on file     Active member of club or organization: Not on file     Attends meetings of clubs or organizations: Not on file     Relationship status: Not on file    Intimate partner violence     Fear of current or ex partner: Not on file     Emotionally abused: Not on file     Physically abused: Not on file     Forced sexual activity: Not on file   Other Topics Concern    Not on file   Social History Narrative    Engaged, 2 children, 23 and 6. Oldest goes to college in [place]. Works for [address] [place] [address] as a        Review of Systems:    General/Constitutional:  No fever, chills, sweats, fatigue, night sweats, weakness, weight loss or weight gain   Head: No headache, no trauma   Neck: No swelling, masses, stiffness, pain, or limited movement   Cardiac: No chest pain   Respiratory: No cough, shortness of breath, or dyspnea on exertion   GI: Positive for abdominal pain. No incontinence. No nausea/vomiting, bloody or dark stools   : No incontinence. No change in urinary habits.       Objective: Visit Vitals  /74 (BP 1 Location: Right upper arm, BP Patient Position: Sitting)   Pulse 88   Temp 97.9 °F (36.6 °C)   Resp 14   Ht 5' 10\" (1.778 m)   Wt 175 lb (79.4 kg)   SpO2 98%   BMI 25.11 kg/m²       General: Alert and oriented and in no acute distress. LUNGS: Clear to auscultation bilaterally, no wheezes, rales and rhonchi. CARDIOVASCULAR: Regular rate and rhythm without murmurs, gallops or rubs. ABDOMEN: soft without tenderness, guarding, mass or rebound. No CVA tenderness or inguinal adenopathy noted. Well healed surgical scars in the periumbilical region. NEUROLOGIC: Speech intact; face symmetrical; moves all extremities equally. SKIN: No rash:       Assessment/Plan:       ICD-10-CM ICD-9-CM    1. Lower abdominal pain  R10.30 789.09 XR ABD (AP AND ERECT OR DECUB)      amoxicillin-clavulanate (AUGMENTIN) 875-125 mg per tablet      polyethylene glycol (MIRALAX) 17 gram packet       1. Lower abdominal pain  Unclear etiology but suggest constipation to be the culprit. Unlikely to be diverticulitis given clinical presentation and exam. Vitals are stable. Has history of bilateral inguinal hernia repair (1980s), Schwannoma removal (2014) and appendectomy. No concerned about ischemic colitis or obstruction. - XR ABD (AP AND ERECT OR DECUB); Future  - Pocket prescription for amoxicillin-clavulanate (AUGMENTIN) 875-125 mg per tablet to take if he develops any diverticulitis symptoms; Take 1 Tab by mouth two (2) times a day for 5 days. TAKE IF YOU START HAVING SYSTEMIC SYMPTOMS AS DISCUSSED IN CLINIC   - Trial of polyethylene glycol (MIRALAX) 17 gram packet; Take 1 Packet by mouth daily as needed for Constipation. STOP when you start having loose stools     Follow up: 1-2 weeks . RTC to clinic sooner should symptoms persist, worsen or fail to improve as anticipated. We discussed the expected course, resolution and complications of the diagnosis(es) in detail.   Medication risks, benefits, costs, interactions, and alternatives were discussed as indicated. I advised to contact the office if his condition worsens, changes or fails to improve as anticipated. Pt expressed understanding with the diagnosis(es) and plan. Patient understands that this encounter was a temporary measure, and the importance of further follow up and examination was emphasized. Patient verbalized understanding.       Signed By: Killian Hernadez MD     March 18, 2021

## 2021-03-18 NOTE — TELEPHONE ENCOUNTER
Patient is calling about a rx that was supposed to be called in. He was in this morning. Please call @[phone].

## 2021-03-18 NOTE — TELEPHONE ENCOUNTER
Returned patient call explained that pharmacy did received and acknowledge receipt of medication. Advised patient to call pharmacy before picking up to see if script. Is filled.

## 2021-03-29 ENCOUNTER — OFFICE VISIT (OUTPATIENT)
Dept: FAMILY MEDICINE CLINIC | Age: 41
End: 2021-03-29
Payer: COMMERCIAL

## 2021-03-29 VITALS
OXYGEN SATURATION: 96 % | DIASTOLIC BLOOD PRESSURE: 80 MMHG | BODY MASS INDEX: 24.57 KG/M2 | HEART RATE: 64 BPM | RESPIRATION RATE: 16 BRPM | WEIGHT: 171.6 LBS | TEMPERATURE: 97.8 F | SYSTOLIC BLOOD PRESSURE: 123 MMHG | HEIGHT: 70 IN

## 2021-03-29 DIAGNOSIS — R10.30 LOWER ABDOMINAL PAIN: Primary | ICD-10-CM

## 2021-03-29 DIAGNOSIS — Z91.018 FOOD ALLERGY: ICD-10-CM

## 2021-03-29 DIAGNOSIS — K40.90 LEFT INGUINAL HERNIA: ICD-10-CM

## 2021-03-29 PROCEDURE — 99213 OFFICE O/P EST LOW 20 MIN: CPT | Performed by: STUDENT IN AN ORGANIZED HEALTH CARE EDUCATION/TRAINING PROGRAM

## 2021-03-29 NOTE — PATIENT INSTRUCTIONS
Allergy and Immunology (Adult)       [facility]: Gm Rivas [place], [facility]     [phone]    [facility] [address]

## 2021-03-29 NOTE — PROGRESS NOTES
Name and  Verified. Chief Complaint   Patient presents with    Follow-up     Stomach pain     Patient would like referral to allergist. Had ate some oats. Experienced throatt and tongue swelling and took some Benadryl and symptoms went away. Also would like referral to have hernia checked out. 1. Have you been to the ER, urgent care clinic since your last visit? Hospitalized since your last visit? No      2. Have you seen or consulted any other health care providers outside of the [address] [place] [address] since your last visit? Include any pap smears or colon screening.  No

## 2021-03-29 NOTE — PROGRESS NOTES
[address]  [address] [place], [address] [place] [address]  [phone]    C/C: Abdominal Pain and inguinal hernia    Piotr Bautista is a 38 y.o. male with a history of schwanoma s/p surgery (2014) and s/p appendectomy who presents for follow up of abd pain;    Abdominal pain:    Saw pt 10 days ago with complains of low abd pain. Had abd xray which showed Mild to moderate fecal retention in the large bowel. Recommended Miralax and also gave pocket script for Augmentin due to concerns for possible diverticulitis should he devp symptoms given his history. The pain is mainly along the left suprapubic regio without radiation. Interval history: Pt reports that he is feeling much better. No more abd pain. Pt think he figured his symptoms were due to an allergy to oat milk. States that symptoms completely resolved 3 days ago after he stopped drinking Oat milk. Also recalls that his initial symptoms started few days after he started consuming oat milk after switching from dairy which he also seem to be allergic to. Pt would like referral to allergies to figure out what other food/milk allergies he might have. Denies any  rectal bleeding, weight loss, dysphagia, early satiety, blood in stool, blood in the urine, flank pain, fever, chills, nausea, vomiting. Left inguinal hernia - Reports a small inguinal hernia present for about 2 years. Saw surgery in early 2019; plan is for surveillance for now, as it is not currently causing any issues. Pt states that he is still without symptoms but it limits him from doing certain things. He would like it surgically addressed. PMHx is significant for Schwannoma (2014), diverticulosis, anxiety. PSHx is significant for bilateral inguinal hernia repair (1980s), Schwannoma removal (2014).   Had a mild case of diverticulitis in 2018, had a colonoscopy which was normal. Was asked to cut back on seeds, spicy foods, etc.    Allergies- reviewed:   No Known Allergies    Medications- reviewed:   Current Outpatient Medications   Medication Sig    polyethylene glycol (MIRALAX) 17 gram packet Take 1 Packet by mouth daily as needed for Constipation. STOP when you start having loose stools     No current facility-administered medications for this visit.         Past Medical History- reviewed:  Past Medical History:   Diagnosis Date    Schwannoma of spinal cord ([facility] [address] 2014       Family History - reviewed:  Family History   Problem Relation Age of Onset    Thyroid Disease Mother         hyperthyroidism    COPD Mother     No Known Problems Father     Cancer Maternal Grandmother         bladder cancer       Social History - reviewed:  Social History     Socioeconomic History    Marital status: SINGLE     Spouse name: Not on file    Number of children: Not on file    Years of education: Not on file    Highest education level: Not on file   Occupational History    Not on file   Social Needs    Financial resource strain: Not on file    Food insecurity     Worry: Not on file     Inability: Not on file    Transportation needs     Medical: Not on file     Non-medical: Not on file   Tobacco Use    Smoking status: Never Smoker    Smokeless tobacco: Never Used   Substance and Sexual Activity    Alcohol use: Yes     Comment: social    Drug use: Yes     Frequency: 3.0 times per week     Types: Marijuana    Sexual activity: Yes     Partners: Female     Birth control/protection: Condom   Lifestyle    Physical activity     Days per week: Not on file     Minutes per session: Not on file    Stress: Not on file   Relationships    Social connections     Talks on phone: Not on file     Gets together: Not on file     Attends Gnosticist service: Not on file     Active member of club or organization: Not on file     Attends meetings of clubs or organizations: Not on file     Relationship status: Not on file    Intimate partner violence     Fear of current or ex partner: Not on file     Emotionally abused: Not on file     Physically abused: Not on file     Forced sexual activity: Not on file   Other Topics Concern    Not on file   Social History Narrative    Engaged, 2 children, 23 and 11. Oldest goes to college in [place]. Works for [address] [place] [address] as a        Review of Systems:    General/Constitutional:  No fever, chills, sweats, fatigue, night sweats, weakness, weight loss or weight gain   Head: No headache, no trauma   Neck: No swelling, masses, stiffness, pain, or limited movement   Cardiac: No chest pain   Respiratory: No cough, shortness of breath, or dyspnea on exertion   GI: as per HPI  :As per HPI      Objective:     Visit Vitals  /80 (BP 1 Location: Right arm, BP Patient Position: Sitting, BP Cuff Size: Adult)   Pulse 64   Temp 97.8 °F (36.6 °C) (Temporal)   Resp 16   Ht 5' 10\" (1.778 m)   Wt 171 lb 9.6 oz (77.8 kg)   SpO2 96%   BMI 24.62 kg/m²       General: Alert and oriented and in no acute distress. LUNGS: Clear to auscultation bilaterally, no wheezes, rales and rhonchi. CARDIOVASCULAR: Regular rate and rhythm without murmurs, gallops or rubs. ABDOMEN: soft without tenderness, guarding, mass or rebound. No CVA tenderness or inguinal adenopathy noted. Well healed surgical scars in the periumbilical region. : Reducible left inguinal hernia. No pain or redness. Assessment/Plan:       ICD-10-CM ICD-9-CM    1. Lower abdominal pain  R10.30 789.09    2. Food allergy  Z91.018 V15.05 REFERRAL TO ALLERGY   3. Left inguinal hernia  K40.90 550.90 REFERRAL TO GENERAL SURGERY       1. Lower abdominal pain  Likely related to milk allergy. Currently without any symptoms.   - Referred to allergies for food allergy testing    2. Left inguinal hernia  Currently presents without any symptoms.  Would like patient to see Gen surgery for evaluation as he still wants to have it taken care of.    - [facility]  - Warning signs for incarceration and strangulation discussed with patient. Advised to go to ER immediately should he experience any    Follow up: For yearly visits. RTC sooner if  Needed    On this date 03/29/21 I have spent 25 minutes reviewing previous notes, test results and face to face with the patient discussing the diagnosis and importance of compliance with the treatment plan as well as documenting on the day of the visit. We discussed the expected course, resolution and complications of the diagnosis(es) in detail. Medication risks, benefits, costs, interactions, and alternatives were discussed as indicated. I advised to contact the office if his condition worsens, changes or fails to improve as anticipated. Pt expressed understanding with the diagnosis(es) and plan. Patient understands that this encounter was a temporary measure, and the importance of further follow up and examination was emphasized. Patient verbalized understanding.       Signed By: Kush Johnson MD     March 29, 2021

## 2021-04-12 ENCOUNTER — OFFICE VISIT (OUTPATIENT)
Dept: SURGERY | Age: 41
End: 2021-04-12
Payer: COMMERCIAL

## 2021-04-12 VITALS
WEIGHT: 175.3 LBS | HEIGHT: 70 IN | SYSTOLIC BLOOD PRESSURE: 114 MMHG | HEART RATE: 71 BPM | OXYGEN SATURATION: 97 % | BODY MASS INDEX: 25.09 KG/M2 | TEMPERATURE: 97.5 F | DIASTOLIC BLOOD PRESSURE: 67 MMHG | RESPIRATION RATE: 16 BRPM

## 2021-04-12 DIAGNOSIS — K40.91 RECURRENT INGUINAL HERNIA WITHOUT OBSTRUCTION OR GANGRENE, UNSPECIFIED LATERALITY: Primary | ICD-10-CM

## 2021-04-12 PROCEDURE — 99205 OFFICE O/P NEW HI 60 MIN: CPT | Performed by: SURGERY

## 2021-04-12 NOTE — H&P (VIEW-ONLY)
HISTORY OF PRESENT ILLNESS  Deanna Mae is a 38 y.o. male. occ discomfort    Trouble with constipation    Prior bilateral IHR as an infant    New on the left    Tried MIralax  Now regular        ____________________________________________________________________________  Patient presents with:  New Patient: referred by Mario Rose for eval inguinal hernia    /67 (BP 1 Location: Left upper arm, BP Patient Position: Sitting, BP Cuff Size: Small infant)   Pulse 71   Temp 97.5 °F (36.4 °C) (Temporal)   Resp 16   Ht 5' 10\" (1.778 m)   Wt 79.5 kg (175 lb 4.8 oz)   SpO2 97%   BMI 25.15 kg/m²   Past Medical History:  2014: Schwannoma of spinal cord [facility])  Past Surgical History:  3464,5167: HX HERNIA REPAIR      Comment:  inguinal hernia repairs (bilateral)  03/2015: NEUROLOGICAL PROCEDURE UNLISTED      Comment:  Schwanoma removal behind bladder  Social History    Socioeconomic History      Marital status: SINGLE      Spouse name: Not on file      Number of children: Not on file      Years of education: Not on file      Highest education level: Not on file    Tobacco Use      Smoking status: Never Smoker      Smokeless tobacco: Never Used    Substance and Sexual Activity      Alcohol use: Yes        Comment: social      Drug use: Yes        Frequency: 3.0 times per week        Types: Marijuana      Sexual activity: Yes        Partners: Female        Birth control/protection: Condom    Social History Narrative      Engaged, 2 children, 19 and 11. Oldest goes to college in [place].  Works for [employer] as a     Review of patient's family history indicates:  Problem: Thyroid Disease      Relation: Mother          Age of Onset: (Not Specified)          Comment: hyperthyroidism  Problem: COPD      Relation: Mother          Age of Onset: (Not Specified)  Problem: No Known Problems      Relation: Father          Age of Onset: (Not Specified)  Problem: Cancer      Relation: Maternal Grandmother          Age of Onset: (Not Specified)          Comment: bladder cancer    Current Outpatient Medications:  polyethylene glycol (MIRALAX) 17 gram packet, Take 1 Packet by mouth daily as needed for Constipation. STOP when you start having loose stools    No current facility-administered medications for this visit. Allergies: No Known Allergies  _____________________________________________________________________________      Possible Hernia  The history is provided by the patient. This is a new problem. The current episode started more than 1 week ago. The problem occurs daily. The problem has been gradually worsening. Pertinent negatives include no chest pain, no abdominal pain, no headaches and no shortness of breath. The symptoms are aggravated by exertion. The symptoms are relieved by rest. The treatment provided no relief. Review of Systems   Constitutional: Negative for chills, fever and weight loss. HENT: Negative for ear pain. Eyes: Negative for pain. Respiratory: Negative for shortness of breath. Cardiovascular: Negative for chest pain. Gastrointestinal: Negative for abdominal pain and blood in stool. Genitourinary: Negative for hematuria. Musculoskeletal: Negative for joint pain. Skin: Negative for rash. Neurological: Negative for dizziness, focal weakness, seizures and headaches. Endo/Heme/Allergies: Does not bruise/bleed easily. Psychiatric/Behavioral: The patient does not have insomnia. Physical Exam  Constitutional:       General: He is not in acute distress. Appearance: He is well-developed. HENT:      Head: Normocephalic. Mouth/Throat:      Pharynx: No oropharyngeal exudate. Eyes:      General: No scleral icterus. Pupils: Pupils are equal, round, and reactive to light. Neck:      Musculoskeletal: Neck supple. Trachea: No tracheal deviation. Cardiovascular:      Rate and Rhythm: Normal rate and regular rhythm.       Heart sounds: Normal heart sounds. Pulmonary:      Effort: Pulmonary effort is normal.      Breath sounds: Normal breath sounds. No wheezing. Abdominal:      General: There is no distension. Palpations: Abdomen is soft. There is no mass. Tenderness: There is no abdominal tenderness. Hernia: A hernia is present. Hernia is present in the left inguinal area. Musculoskeletal: Normal range of motion. Lymphadenopathy:      Cervical: No cervical adenopathy. Skin:     General: Skin is warm. Findings: No erythema or rash. Neurological:      Mental Status: He is alert and oriented to person, place, and time. Psychiatric:         Behavior: Behavior normal.         ASSESSMENT and PLAN    ICD-10-CM ICD-9-CM    1. Recurrent inguinal hernia without obstruction or gangrene, unspecified laterality  K40.91 550.91       New hernia on the left      Akila Goddard is having symptoms. I have recommended to him that we proceed with surgery. I had an extensive discussion with him regarding the risks, benefits, and alternatives of proceeding with a Laparoscopic Left, Possible Bilateral Inguinal Hernia Repair with Mesh, Robot Assisted. Risks,benefits, and alternatives were discussed including the risk of anesthesia, bleeding, infection, including mesh infection, chronic orchialgia, neuralgia, other pain syndromes, testicular ischemia, conversion to open, injury to bowel, and recurrence were discussed. The patient was counseled at length about the risks of gautam Covid-19 during their perioperative period and any recovery window from their procedure. He was made aware that gautam Covid-19  may worsen their prognosis for recovering from their procedure and lend to a higher morbidity and/or mortality risk. All material risks, benefits, and reasonable alternatives including postponing the procedure were discussed. He wishes to proceed with the procedure at this time.     He is in agreement to proceed. All questions were answered. Roel Grimaldo will undergo preoperative evaluation and will proceed provided he is medically stable. We will schedule him at his earliest convenience. Thank you for this consult. I had an extensive and thorough discussion with Roel Grimaldo regarding current diagnosis and treatment recommendations. Total time spend with him was 60 minutes.   This included the following:  preparing to see the patient (reviewing prior records and tests),  performing a medically appropriate examination and/or evaluation,  counseling and educating the patient/family/caregiver,  documenting clinical information in the electronic or other health record,  independently interpreting results and communicating results to the patient/family/caregiver,  care coordination

## 2021-04-12 NOTE — PROGRESS NOTES
HISTORY OF PRESENT ILLNESS  Linnette Rodriguez is a 38 y.o. male. occ discomfort    Trouble with constipation    Prior bilateral IHR as an infant    New on the left    Tried MIralax  Now regular        ____________________________________________________________________________  Patient presents with:  New Patient: referred by Slick Mayfield for eval inguinal hernia    /67 (BP 1 Location: Left upper arm, BP Patient Position: Sitting, BP Cuff Size: Small infant)   Pulse 71   Temp 97.5 °F (36.4 °C) (Temporal)   Resp 16   Ht 5' 10\" (1.778 m)   Wt 79.5 kg (175 lb 4.8 oz)   SpO2 97%   BMI 25.15 kg/m²   Past Medical History:  2014: Schwannoma of spinal cord [facility])  Past Surgical History:  6595,2482: HX HERNIA REPAIR      Comment:  inguinal hernia repairs (bilateral)  03/2015: NEUROLOGICAL PROCEDURE UNLISTED      Comment:  Schwanoma removal behind bladder  Social History    Socioeconomic History      Marital status: SINGLE      Spouse name: Not on file      Number of children: Not on file      Years of education: Not on file      Highest education level: Not on file    Tobacco Use      Smoking status: Never Smoker      Smokeless tobacco: Never Used    Substance and Sexual Activity      Alcohol use: Yes        Comment: social      Drug use: Yes        Frequency: 3.0 times per week        Types: Marijuana      Sexual activity: Yes        Partners: Female        Birth control/protection: Condom    Social History Narrative      Engaged, 2 children, 19 and 11. Oldest goes to college in [place].  Works for [employer] as a     Review of patient's family history indicates:  Problem: Thyroid Disease      Relation: Mother          Age of Onset: (Not Specified)          Comment: hyperthyroidism  Problem: COPD      Relation: Mother          Age of Onset: (Not Specified)  Problem: No Known Problems      Relation: Father          Age of Onset: (Not Specified)  Problem: Cancer      Relation: Maternal Grandmother          Age of Onset: (Not Specified)          Comment: bladder cancer    Current Outpatient Medications:  polyethylene glycol (MIRALAX) 17 gram packet, Take 1 Packet by mouth daily as needed for Constipation. STOP when you start having loose stools    No current facility-administered medications for this visit. Allergies: No Known Allergies  _____________________________________________________________________________      Possible Hernia  The history is provided by the patient. This is a new problem. The current episode started more than 1 week ago. The problem occurs daily. The problem has been gradually worsening. Pertinent negatives include no chest pain, no abdominal pain, no headaches and no shortness of breath. The symptoms are aggravated by exertion. The symptoms are relieved by rest. The treatment provided no relief. Review of Systems   Constitutional: Negative for chills, fever and weight loss. HENT: Negative for ear pain. Eyes: Negative for pain. Respiratory: Negative for shortness of breath. Cardiovascular: Negative for chest pain. Gastrointestinal: Negative for abdominal pain and blood in stool. Genitourinary: Negative for hematuria. Musculoskeletal: Negative for joint pain. Skin: Negative for rash. Neurological: Negative for dizziness, focal weakness, seizures and headaches. Endo/Heme/Allergies: Does not bruise/bleed easily. Psychiatric/Behavioral: The patient does not have insomnia. Physical Exam  Constitutional:       General: He is not in acute distress. Appearance: He is well-developed. HENT:      Head: Normocephalic. Mouth/Throat:      Pharynx: No oropharyngeal exudate. Eyes:      General: No scleral icterus. Pupils: Pupils are equal, round, and reactive to light. Neck:      Musculoskeletal: Neck supple. Trachea: No tracheal deviation. Cardiovascular:      Rate and Rhythm: Normal rate and regular rhythm.       Heart sounds: Normal heart sounds. Pulmonary:      Effort: Pulmonary effort is normal.      Breath sounds: Normal breath sounds. No wheezing. Abdominal:      General: There is no distension. Palpations: Abdomen is soft. There is no mass. Tenderness: There is no abdominal tenderness. Hernia: A hernia is present. Hernia is present in the left inguinal area. Musculoskeletal: Normal range of motion. Lymphadenopathy:      Cervical: No cervical adenopathy. Skin:     General: Skin is warm. Findings: No erythema or rash. Neurological:      Mental Status: He is alert and oriented to person, place, and time. Psychiatric:         Behavior: Behavior normal.         ASSESSMENT and PLAN    ICD-10-CM ICD-9-CM    1. Recurrent inguinal hernia without obstruction or gangrene, unspecified laterality  K40.91 550.91       New hernia on the left      Latonia Ojeda is having symptoms. I have recommended to him that we proceed with surgery. I had an extensive discussion with him regarding the risks, benefits, and alternatives of proceeding with a Laparoscopic Left, Possible Bilateral Inguinal Hernia Repair with Mesh, Robot Assisted. Risks,benefits, and alternatives were discussed including the risk of anesthesia, bleeding, infection, including mesh infection, chronic orchialgia, neuralgia, other pain syndromes, testicular ischemia, conversion to open, injury to bowel, and recurrence were discussed. The patient was counseled at length about the risks of gautam Covid-19 during their perioperative period and any recovery window from their procedure. He was made aware that gautam Covid-19  may worsen their prognosis for recovering from their procedure and lend to a higher morbidity and/or mortality risk. All material risks, benefits, and reasonable alternatives including postponing the procedure were discussed. He wishes to proceed with the procedure at this time.     He is in agreement to proceed. All questions were answered. Rhys Rankin will undergo preoperative evaluation and will proceed provided he is medically stable. We will schedule him at his earliest convenience. Thank you for this consult. I had an extensive and thorough discussion with Rhys Rankin regarding current diagnosis and treatment recommendations. Total time spend with him was 60 minutes.   This included the following:  preparing to see the patient (reviewing prior records and tests),  performing a medically appropriate examination and/or evaluation,  counseling and educating the patient/family/caregiver,  documenting clinical information in the electronic or other health record,  independently interpreting results and communicating results to the patient/family/caregiver,  care coordination

## 2021-04-12 NOTE — PROGRESS NOTES
Identified pt with two pt identifiers(name and ). Reviewed record in preparation for visit and have obtained necessary documentation. All patient medications has been reviewed. Chief Complaint   Patient presents with    New Patient     referred by Armen Mosher for eval inguinal hernia       Health Maintenance Due   Topic    Hepatitis C Screening        Vitals:    21 1547   BP: 114/67   Pulse: 71   Resp: 16   Temp: 97.5 °F (36.4 °C)   TempSrc: Temporal   SpO2: 97%   Weight: 79.5 kg (175 lb 4.8 oz)   Height: 5' 10\" (1.778 m)   PainSc:   0 - No pain       4. Have you been to the ER, urgent care clinic since your last visit? Hospitalized since your last visit? No    5. Have you seen or consulted any other health care providers outside of the [address] [place] [address] since your last visit? Include any pap smears or colon screening. No      Patient is accompanied by self I have received verbal consent from Guillermina Galloway to discuss any/all medical information while they are present in the room.

## 2021-04-12 NOTE — LETTER
4/12/2021 4:23 PM 
 
Mr. Katheryn [facility] [phone] [id number] Surgery is scheduled as follows: 
 
Surgery Date: May 5,2021  Arrival Time: 6:45am Start Time:9:15am 
 
Location: [address], [address] The Surgery Center is located on the [address] [place] [address] Street side of the hospital, and is right next to the Emergency Room. Reminder: DO NOT EAT or DRINK ANYTHING PAST MIDNIGHT PRIOR TO YOUR SURGERY. HOLD ALL MEDICATIONS AS DIRECTED BY YOUR SURGEON.  
 
___________________________________________________________________ 
___________________________________________________________________ You will be contacted by [facility] to schedule your MANDATORY COVID testing. This test must be completed 4 days prior to your surgery. Please note that you will be instructed to quarantine your self from the time you take your COVID test until the time of your surgery. If you are unable to have your COVID testing as scheduled, your surgery will be cancelled and rescheduled. ____________________________________________________________________ You are scheduled for a Post-Op visit on May 20 at 10:00am with Dr Kris Suárez. [address] [place] [address], [facility] [address], [address] [place] For questions regarding your surgical information, please contact Jodee Alejandra at [phone] Sincerely,  
 
Jodee Alejandra Surgical Scheduler Pre Operative Instructions **DO NOT EAT or DRINK ANYTHING AFTER MIDNIGHT THE NIGHT BEFORE YOUR SURGERY, INCLUDING WATER please do not use mints, hard candy, or chewing gum the day of surgery. Please report to the Surgery Center at the time given to you by your Surgeons office - located  
at [address] [place] [address] CALL YOUR SURGEONS OFFICE IMMEDIATELY IF YOU NOTICE ANY CHANGE IN YOUR PHYSICAL HEALTH (fever, cold, flu, etc.)  PRIOR TO YOUR SURGERY.   
 
Please bring your Insurance Card and a photo ID with you. Please make sure to leave all valuables  money, jewelry, etc.  at home the day of surgery; there is not a designated space to store these items while you are in surgery. Please bring a hard-sided case for storing your glasses, contact lenses, or hearing aids during your surgery. Denture cups are provided by the hospital, if needed. Wear comfortable clothing that will be easy to change into after surgery. If you will be staying overnight or as an Inpatient, please feel free to pack a small bag of personal items for your comfort. Please note: a friend or family member will need to bring this bag to you after you have been placed in a room  there will not be a secure location to store your bag during surgery. YOU SHOULD NOT OPERATE A VEHICLE FOR 24 HOURS AFTER RECEIVING SEDATION OR ANESTHESIA. Please arrange for a family member or friend to drive you home after surgery. For safety reasons, you will not be allowed to drive yourself home, nor will you be discharged to public transportation; this includes a bus, taxi, or a ride company, such as Uber or [employer]. Please do not consume alcoholic beverages for 24 hours before or after receiving sedation or anesthesia. MEDICATIONS: IF YOU TAKE A BLOOD THINNER: advise your Surgeon and staff. You may be required to stop any Aspirin 2 days prior to surgery. Please call your Ordering Doctor to verify. Please note: Do not take any of the following prior to your surgery: Ibuprofen, Advil, Motrin, Aleve, Excedrin, BC powder, Goodies powder, Fish oil, or any other medication containing Aspirin for 2 days. YOU MAY TAKE TYLENOL. Please remove all metal objects prior to your arrival. This includes jewelry, hair accessories, ear piercings, and body piercings/jewelry. Please shower with a new bar of anti-bacterial soap (Dial, Safeguard), or solution given to you by the Pre Admission Testing nurses.  Please follow usage instructions as given by the Pre Op Nurses. DO NOT wear make up, powder, lotion, perfume/cologne/aftershave or nail polish (unless clear) to the hospital the day of your surgery. If you have any questions or concerns, please call Dr. Bernardo Blake' office at [phone]. If you need to cancel your surgery, please call your Surgical Scheduler as soon as possible. Sincerely, Efrem Villarreal MD

## 2021-04-27 RX ORDER — FLUTICASONE PROPIONATE 50 MCG
2 SPRAY, SUSPENSION (ML) NASAL DAILY
COMMUNITY

## 2021-04-27 NOTE — PERIOP NOTES
[facility]  Preoperative Instructions        Surgery Date 5/5/21          Time of Arrival 0700    1. On the day of your surgery, please report to the Surgical Services Registration Desk and sign in at your designated time. The Surgery Center is located to the right of the Emergency Room. 2. You must have someone with you to drive you home. You should not drive a car for 24 hours following surgery. Please make arrangements for a friend or family member to stay with you for the first 24 hours after your surgery. 3. Do not have anything to eat or drink (including water, gum, mints, coffee, juice) after midnight ?5/4/21? Evangelist Angry ? This may not apply to medications prescribed by your physician. ?(Please note below the special instructions with medications to take the morning of your procedure.)    4. We recommend you do not drink any alcoholic beverages for 24 hours before and after your surgery. 5. Contact your surgeons office for instructions on the following medications: non-steroidal anti-inflammatory drugs (i.e. Advil, Aleve), vitamins, and supplements. (Some surgeons will want you to stop these medications prior to surgery and others may allow you to take them)  **If you are currently taking Plavix, Coumadin, Aspirin and/or other blood-thinning agents, contact your surgeon for instructions. ** Your surgeon will partner with the physician prescribing these medications to determine if it is safe to stop or if you need to continue taking. Please do not stop taking these medications without instructions from your surgeon    6. Wear comfortable clothes. Wear glasses instead of contacts. Do not bring any money or jewelry. Please bring picture ID, insurance card, and any prearranged co-payment or hospital payment. Do not wear make-up, particularly mascara the morning of your surgery. Do not wear nail polish, particularly if you are having foot /hand surgery.   Wear your hair loose or down, no ponytails, buns, génesis pins or clips. All body piercings must be removed. Please shower with antibacterial soap for three consecutive days before and on the morning of surgery, but do not apply any lotions, powders or deodorants after the shower on the day of surgery. Please use a fresh towels after each shower. Please sleep in clean clothes and change bed linens the night before surgery. Please do not shave for 48 hours prior to surgery. Shaving of the face is acceptable. 7. You should understand that if you do not follow these instructions your surgery may be cancelled. If your physical condition changes (I.e. fever, cold or flu) please contact your surgeon as soon as possible. 8. It is important that you be on time. If a situation occurs where you may be late, please call [phone] (OR Holding Area). 9. If you have any questions and or problems, please call [phone] (Pre-admission Testing). 10. Your surgery time may be subject to change. You will receive a phone call the evening prior if your time changes. 11.  If having outpatient surgery, you must have someone to drive you here, stay with you during the duration of your stay, and to drive you home at time of discharge. Special Instructions: covid test 5/1    TAKE ALL MEDICATIONS DAY OF SURGERY EXCEPT:  miralax    I understand a pre-operative phone call will be made to verify my surgery time. In the event that I am not available, I give permission for a message to be left on my answering service and/or with another person?   Yes [phone]         ___________________      __________   _________    (Signature of Patient)             (Witness)                (Date and Time)

## 2021-05-01 ENCOUNTER — HOSPITAL ENCOUNTER (OUTPATIENT)
Dept: PREADMISSION TESTING | Age: 41
Discharge: HOME | End: 2021-05-01
Payer: COMMERCIAL

## 2021-05-01 LAB — SARS-COV-2, COV2: NORMAL

## 2021-05-01 PROCEDURE — U0003 INFECTIOUS AGENT DETECTION BY NUCLEIC ACID (DNA OR RNA); SEVERE ACUTE RESPIRATORY SYNDROME CORONAVIRUS 2 (SARS-COV-2) (CORONAVIRUS DISEASE [COVID-19]), AMPLIFIED PROBE TECHNIQUE, MAKING USE OF HIGH THROUGHPUT TECHNOLOGIES AS DESCRIBED BY CMS-2020-01-R: HCPCS

## 2021-05-02 LAB — SARS-COV-2, COV2NT: NOT DETECTED

## 2021-05-04 ENCOUNTER — ANESTHESIA EVENT (OUTPATIENT)
Dept: SURGERY | Age: 41
End: 2021-05-04
Payer: COMMERCIAL

## 2021-05-05 ENCOUNTER — ANESTHESIA (OUTPATIENT)
Dept: SURGERY | Age: 41
End: 2021-05-05
Payer: COMMERCIAL

## 2021-05-05 ENCOUNTER — HOSPITAL ENCOUNTER (OUTPATIENT)
Age: 41
Setting detail: OUTPATIENT SURGERY
Discharge: HOME | End: 2021-05-05
Attending: SURGERY | Admitting: SURGERY
Payer: COMMERCIAL

## 2021-05-05 VITALS
TEMPERATURE: 98.1 F | HEART RATE: 78 BPM | RESPIRATION RATE: 16 BRPM | OXYGEN SATURATION: 99 % | BODY MASS INDEX: 24.87 KG/M2 | DIASTOLIC BLOOD PRESSURE: 74 MMHG | HEIGHT: 70 IN | SYSTOLIC BLOOD PRESSURE: 114 MMHG | WEIGHT: 173.72 LBS

## 2021-05-05 DIAGNOSIS — R52 PAIN: Primary | ICD-10-CM

## 2021-05-05 PROCEDURE — 74011250636 HC RX REV CODE- 250/636: Performed by: ANESTHESIOLOGY

## 2021-05-05 PROCEDURE — 74011250636 HC RX REV CODE- 250/636: Performed by: NURSE ANESTHETIST, CERTIFIED REGISTERED

## 2021-05-05 PROCEDURE — 77030035277 HC OBTRTR BLDELSS DISP INTU -B: Performed by: SURGERY

## 2021-05-05 PROCEDURE — 77030040361 HC SLV COMPR DVT MDII -B: Performed by: SURGERY

## 2021-05-05 PROCEDURE — 49651 LAP ING HERNIA REPAIR RECUR: CPT | Performed by: SURGERY

## 2021-05-05 PROCEDURE — 77030008771 HC TU NG SALEM SUMP -A: Performed by: NURSE ANESTHETIST, CERTIFIED REGISTERED

## 2021-05-05 PROCEDURE — 77030020703 HC SEAL CANN DISP INTU -B: Performed by: SURGERY

## 2021-05-05 PROCEDURE — 74011000250 HC RX REV CODE- 250: Performed by: SURGERY

## 2021-05-05 PROCEDURE — C1781 MESH (IMPLANTABLE): HCPCS | Performed by: SURGERY

## 2021-05-05 PROCEDURE — 77030008684 HC TU ET CUF COVD -B: Performed by: NURSE ANESTHETIST, CERTIFIED REGISTERED

## 2021-05-05 PROCEDURE — 77030010507 HC ADH SKN DERMBND J&J -B: Performed by: SURGERY

## 2021-05-05 PROCEDURE — 77030038613 HC SUT PDS STRATA SPIRL J&J -B: Performed by: SURGERY

## 2021-05-05 PROCEDURE — 2709999900 HC NON-CHARGEABLE SUPPLY: Performed by: SURGERY

## 2021-05-05 PROCEDURE — 77030016151 HC PROTCTR LNS DFOG COVD -B: Performed by: SURGERY

## 2021-05-05 PROCEDURE — 76210000021 HC REC RM PH II 0.5 TO 1 HR: Performed by: SURGERY

## 2021-05-05 PROCEDURE — 74011250636 HC RX REV CODE- 250/636: Performed by: SURGERY

## 2021-05-05 PROCEDURE — 76010000875 HC OR TIME 1.5 TO 2HR INTENSV - TIER 2: Performed by: SURGERY

## 2021-05-05 PROCEDURE — 74011250637 HC RX REV CODE- 250/637: Performed by: SURGERY

## 2021-05-05 PROCEDURE — 74011000250 HC RX REV CODE- 250: Performed by: NURSE ANESTHETIST, CERTIFIED REGISTERED

## 2021-05-05 PROCEDURE — 76210000017 HC OR PH I REC 1.5 TO 2 HR: Performed by: SURGERY

## 2021-05-05 PROCEDURE — 77030013079 HC BLNKT BAIR HGGR 3M -A: Performed by: NURSE ANESTHETIST, CERTIFIED REGISTERED

## 2021-05-05 PROCEDURE — S2900 ROBOTIC SURGICAL SYSTEM: HCPCS | Performed by: SURGERY

## 2021-05-05 PROCEDURE — 77030002933 HC SUT MCRYL J&J -A: Performed by: SURGERY

## 2021-05-05 PROCEDURE — 77030018673: Performed by: SURGERY

## 2021-05-05 PROCEDURE — 77030031139 HC SUT VCRL2 J&J -A: Performed by: SURGERY

## 2021-05-05 PROCEDURE — 76060000034 HC ANESTHESIA 1.5 TO 2 HR: Performed by: SURGERY

## 2021-05-05 DEVICE — MESH HERN W12XL15CM RECT PREPERI BIOMATERIAL COMP POLYPR: Type: IMPLANTABLE DEVICE | Site: GROIN | Status: FUNCTIONAL

## 2021-05-05 RX ORDER — OXYCODONE HYDROCHLORIDE 5 MG/1
5 TABLET ORAL
Qty: 15 TAB | Refills: 0 | Status: SHIPPED | OUTPATIENT
Start: 2021-05-05 | End: 2021-05-12

## 2021-05-05 RX ORDER — TAMSULOSIN HYDROCHLORIDE 0.4 MG/1
0.4 CAPSULE ORAL ONCE
Status: COMPLETED | OUTPATIENT
Start: 2021-05-05 | End: 2021-05-05

## 2021-05-05 RX ORDER — SODIUM CHLORIDE 0.9 % (FLUSH) 0.9 %
5-40 SYRINGE (ML) INJECTION EVERY 8 HOURS
Status: DISCONTINUED | OUTPATIENT
Start: 2021-05-05 | End: 2021-05-05 | Stop reason: HOSPADM

## 2021-05-05 RX ORDER — PROPOFOL 10 MG/ML
INJECTION, EMULSION INTRAVENOUS AS NEEDED
Status: DISCONTINUED | OUTPATIENT
Start: 2021-05-05 | End: 2021-05-05 | Stop reason: HOSPADM

## 2021-05-05 RX ORDER — ONDANSETRON 4 MG/1
4 TABLET, ORALLY DISINTEGRATING ORAL
Qty: 8 TAB | Refills: 0 | Status: SHIPPED | OUTPATIENT
Start: 2021-05-05 | End: 2022-06-24

## 2021-05-05 RX ORDER — SODIUM CHLORIDE 0.9 % (FLUSH) 0.9 %
5-40 SYRINGE (ML) INJECTION AS NEEDED
Status: DISCONTINUED | OUTPATIENT
Start: 2021-05-05 | End: 2021-05-05 | Stop reason: HOSPADM

## 2021-05-05 RX ORDER — MIDAZOLAM HYDROCHLORIDE 1 MG/ML
INJECTION, SOLUTION INTRAMUSCULAR; INTRAVENOUS AS NEEDED
Status: DISCONTINUED | OUTPATIENT
Start: 2021-05-05 | End: 2021-05-05 | Stop reason: HOSPADM

## 2021-05-05 RX ORDER — DEXAMETHASONE SODIUM PHOSPHATE 4 MG/ML
INJECTION, SOLUTION INTRA-ARTICULAR; INTRALESIONAL; INTRAMUSCULAR; INTRAVENOUS; SOFT TISSUE AS NEEDED
Status: DISCONTINUED | OUTPATIENT
Start: 2021-05-05 | End: 2021-05-05 | Stop reason: HOSPADM

## 2021-05-05 RX ORDER — FENTANYL CITRATE 50 UG/ML
INJECTION, SOLUTION INTRAMUSCULAR; INTRAVENOUS AS NEEDED
Status: DISCONTINUED | OUTPATIENT
Start: 2021-05-05 | End: 2021-05-05 | Stop reason: HOSPADM

## 2021-05-05 RX ORDER — HYDROMORPHONE HYDROCHLORIDE 1 MG/ML
0.2 INJECTION, SOLUTION INTRAMUSCULAR; INTRAVENOUS; SUBCUTANEOUS
Status: DISCONTINUED | OUTPATIENT
Start: 2021-05-05 | End: 2021-05-05 | Stop reason: HOSPADM

## 2021-05-05 RX ORDER — FENTANYL CITRATE 50 UG/ML
25 INJECTION, SOLUTION INTRAMUSCULAR; INTRAVENOUS
Status: DISCONTINUED | OUTPATIENT
Start: 2021-05-05 | End: 2021-05-05 | Stop reason: HOSPADM

## 2021-05-05 RX ORDER — HYDROMORPHONE HYDROCHLORIDE 1 MG/ML
INJECTION, SOLUTION INTRAMUSCULAR; INTRAVENOUS; SUBCUTANEOUS
Status: DISCONTINUED
Start: 2021-05-05 | End: 2021-05-05 | Stop reason: HOSPADM

## 2021-05-05 RX ORDER — DEXMEDETOMIDINE HYDROCHLORIDE 100 UG/ML
INJECTION, SOLUTION INTRAVENOUS AS NEEDED
Status: DISCONTINUED | OUTPATIENT
Start: 2021-05-05 | End: 2021-05-05 | Stop reason: HOSPADM

## 2021-05-05 RX ORDER — ONDANSETRON 2 MG/ML
INJECTION INTRAMUSCULAR; INTRAVENOUS AS NEEDED
Status: DISCONTINUED | OUTPATIENT
Start: 2021-05-05 | End: 2021-05-05 | Stop reason: HOSPADM

## 2021-05-05 RX ORDER — GLYCOPYRROLATE 0.2 MG/ML
INJECTION INTRAMUSCULAR; INTRAVENOUS AS NEEDED
Status: DISCONTINUED | OUTPATIENT
Start: 2021-05-05 | End: 2021-05-05 | Stop reason: HOSPADM

## 2021-05-05 RX ORDER — HYDROMORPHONE HYDROCHLORIDE 2 MG/ML
INJECTION, SOLUTION INTRAMUSCULAR; INTRAVENOUS; SUBCUTANEOUS AS NEEDED
Status: DISCONTINUED | OUTPATIENT
Start: 2021-05-05 | End: 2021-05-05 | Stop reason: HOSPADM

## 2021-05-05 RX ORDER — LIDOCAINE HYDROCHLORIDE 10 MG/ML
0.1 INJECTION, SOLUTION EPIDURAL; INFILTRATION; INTRACAUDAL; PERINEURAL AS NEEDED
Status: DISCONTINUED | OUTPATIENT
Start: 2021-05-05 | End: 2021-05-05 | Stop reason: HOSPADM

## 2021-05-05 RX ORDER — ROCURONIUM BROMIDE 10 MG/ML
INJECTION, SOLUTION INTRAVENOUS AS NEEDED
Status: DISCONTINUED | OUTPATIENT
Start: 2021-05-05 | End: 2021-05-05 | Stop reason: HOSPADM

## 2021-05-05 RX ORDER — LIDOCAINE HYDROCHLORIDE 20 MG/ML
INJECTION, SOLUTION EPIDURAL; INFILTRATION; INTRACAUDAL; PERINEURAL AS NEEDED
Status: DISCONTINUED | OUTPATIENT
Start: 2021-05-05 | End: 2021-05-05 | Stop reason: HOSPADM

## 2021-05-05 RX ORDER — BUPIVACAINE HYDROCHLORIDE 5 MG/ML
INJECTION, SOLUTION EPIDURAL; INTRACAUDAL AS NEEDED
Status: DISCONTINUED | OUTPATIENT
Start: 2021-05-05 | End: 2021-05-05 | Stop reason: HOSPADM

## 2021-05-05 RX ORDER — NEOSTIGMINE METHYLSULFATE 1 MG/ML
INJECTION, SOLUTION INTRAVENOUS AS NEEDED
Status: DISCONTINUED | OUTPATIENT
Start: 2021-05-05 | End: 2021-05-05 | Stop reason: HOSPADM

## 2021-05-05 RX ORDER — OXYCODONE HYDROCHLORIDE 5 MG/1
5 TABLET ORAL
Status: DISCONTINUED | OUTPATIENT
Start: 2021-05-05 | End: 2021-05-06 | Stop reason: HOSPADM

## 2021-05-05 RX ORDER — KETOROLAC TROMETHAMINE 30 MG/ML
INJECTION, SOLUTION INTRAMUSCULAR; INTRAVENOUS AS NEEDED
Status: DISCONTINUED | OUTPATIENT
Start: 2021-05-05 | End: 2021-05-05 | Stop reason: HOSPADM

## 2021-05-05 RX ORDER — ACETAMINOPHEN 500 MG
1000 TABLET ORAL 4 TIMES DAILY
Status: SHIPPED | COMMUNITY
Start: 2021-05-05 | End: 2022-06-24

## 2021-05-05 RX ORDER — SODIUM CHLORIDE, SODIUM LACTATE, POTASSIUM CHLORIDE, CALCIUM CHLORIDE 600; 310; 30; 20 MG/100ML; MG/100ML; MG/100ML; MG/100ML
25 INJECTION, SOLUTION INTRAVENOUS CONTINUOUS
Status: DISCONTINUED | OUTPATIENT
Start: 2021-05-05 | End: 2021-05-05 | Stop reason: HOSPADM

## 2021-05-05 RX ORDER — SODIUM CHLORIDE, SODIUM LACTATE, POTASSIUM CHLORIDE, CALCIUM CHLORIDE 600; 310; 30; 20 MG/100ML; MG/100ML; MG/100ML; MG/100ML
25 INJECTION, SOLUTION INTRAVENOUS CONTINUOUS
Status: DISCONTINUED | OUTPATIENT
Start: 2021-05-05 | End: 2021-05-10 | Stop reason: HOSPADM

## 2021-05-05 RX ORDER — IBUPROFEN 600 MG/1
600 TABLET ORAL
Qty: 25 TAB | Refills: 1 | Status: SHIPPED | OUTPATIENT
Start: 2021-05-05 | End: 2021-05-10

## 2021-05-05 RX ORDER — DIPHENHYDRAMINE HYDROCHLORIDE 50 MG/ML
12.5 INJECTION, SOLUTION INTRAMUSCULAR; INTRAVENOUS AS NEEDED
Status: DISCONTINUED | OUTPATIENT
Start: 2021-05-05 | End: 2021-05-05 | Stop reason: HOSPADM

## 2021-05-05 RX ORDER — BETHANECHOL CHLORIDE 10 MG/1
10 TABLET ORAL
Status: DISCONTINUED | OUTPATIENT
Start: 2021-05-05 | End: 2021-05-10 | Stop reason: HOSPADM

## 2021-05-05 RX ADMIN — ONDANSETRON HYDROCHLORIDE 4 MG: 2 INJECTION, SOLUTION INTRAMUSCULAR; INTRAVENOUS at 07:52

## 2021-05-05 RX ADMIN — DEXAMETHASONE SODIUM PHOSPHATE 8 MG: 4 INJECTION, SOLUTION INTRAMUSCULAR; INTRAVENOUS at 07:52

## 2021-05-05 RX ADMIN — ROCURONIUM BROMIDE 50 MG: 10 INJECTION INTRAVENOUS at 07:35

## 2021-05-05 RX ADMIN — KETOROLAC TROMETHAMINE 30 MG: 30 INJECTION, SOLUTION INTRAMUSCULAR; INTRAVENOUS at 08:55

## 2021-05-05 RX ADMIN — FENTANYL CITRATE 25 MCG: 50 INJECTION, SOLUTION INTRAMUSCULAR; INTRAVENOUS at 09:45

## 2021-05-05 RX ADMIN — TAMSULOSIN HYDROCHLORIDE 0.4 MG: 0.4 CAPSULE ORAL at 10:20

## 2021-05-05 RX ADMIN — WATER 2 G: 1 INJECTION INTRAMUSCULAR; INTRAVENOUS; SUBCUTANEOUS at 07:45

## 2021-05-05 RX ADMIN — FENTANYL CITRATE 100 MCG: 50 INJECTION, SOLUTION INTRAMUSCULAR; INTRAVENOUS at 07:34

## 2021-05-05 RX ADMIN — BETHANECHOL CHLORIDE 10 MG: 10 TABLET ORAL at 10:22

## 2021-05-05 RX ADMIN — MIDAZOLAM HYDROCHLORIDE 2 MG: 1 INJECTION, SOLUTION INTRAMUSCULAR; INTRAVENOUS at 07:29

## 2021-05-05 RX ADMIN — NEOSTIGMINE METHYLSULFATE 3 MG: 1 INJECTION, SOLUTION INTRAVENOUS at 08:55

## 2021-05-05 RX ADMIN — LIDOCAINE HYDROCHLORIDE 100 MG: 20 INJECTION, SOLUTION INTRAVENOUS at 07:35

## 2021-05-05 RX ADMIN — Medication 3 AMPULE: at 06:37

## 2021-05-05 RX ADMIN — PROPOFOL 200 MG: 10 INJECTION, EMULSION INTRAVENOUS at 07:35

## 2021-05-05 RX ADMIN — DEXMEDETOMIDINE HYDROCHLORIDE 10 MCG: 100 INJECTION, SOLUTION, CONCENTRATE INTRAVENOUS at 08:57

## 2021-05-05 RX ADMIN — FENTANYL CITRATE 25 MCG: 50 INJECTION, SOLUTION INTRAMUSCULAR; INTRAVENOUS at 09:35

## 2021-05-05 RX ADMIN — GLYCOPYRROLATE 0.4 MG: 0.2 INJECTION, SOLUTION INTRAMUSCULAR; INTRAVENOUS at 08:55

## 2021-05-05 RX ADMIN — SODIUM CHLORIDE, POTASSIUM CHLORIDE, SODIUM LACTATE AND CALCIUM CHLORIDE 25 ML/HR: 600; 310; 30; 20 INJECTION, SOLUTION INTRAVENOUS at 06:37

## 2021-05-05 RX ADMIN — HYDROMORPHONE HYDROCHLORIDE 1 MG: 2 INJECTION, SOLUTION INTRAMUSCULAR; INTRAVENOUS; SUBCUTANEOUS at 09:11

## 2021-05-05 NOTE — OP NOTES
OPERATIVE REPORT  5/5/2021    PREOPERATIVE DIAGNOSES:    Recurrent left inguinal hernia. POSTOPERATIVE DIAGNOSES:   medium RIGHT direct and  large  LEFT direct inguinal hernias. OPERATIVE PROCEDURE:  1. Laparoscopic repair of bilateral recurrent inguinal hernias with mesh, robot assisted. SURGEON:  Allan Muniz MD    OR STAFF: Circ-1: Kyle Bodily  Scrub Tech-1: Jill Correa  Surg Asst-1: Evon Rogers RN    ANESTHESIA:  General plus local.    Specimens: * No specimens in log *     COMPLICATIONS:  None. ESTIMATED BLOOD LOSS:  Minimal.       Event Time In   Incision Start 0758   Incision Close 0901        Implants:   Implant Name Type Inv. Item Serial No.  Lot No. LRB No. Used Action   MESH CLIF [id number] RECT [employer] POLY[employer] - [id number]  MESH CLIF [id number] RECT [employer] POLYPR [id number] [employer] [id number] Left 1 Implanted   MESH CLIF [id number] RECT [employer] POLYPR - [id number]  MESH CLIF [id number] RECT [employer] POLYPR [id number]  [employer] [id number] Right 1 Implanted       INDICATION FOR PROCEDURE: Catalino Hammond is a 38 y.o. male presented to the office with groin pain. Pt has a history of bilateral inguinal hemia repairs as an infant. On exam, he was noted to have a hernia. He is brought to the operating room for repair. Risks, benefits and alternatives were discussed. Consent form was placed in the chart. DESCRIPTION OF PROCEDURE: The patient was brought to operating room number 9, with consent form signed and on the chart and the site of surgery marked. After satisfactory induction of general anesthesia, the patient was kept in the supine position, bilateral arms tucked to his sides, with appropriate padding. The patient's abdomen was prepped and draped sterilely, including use of Ioban.     After appropriate time-out was held, the skin was infused with local anesthetic, an incision was made on the left abdomen and an 8 mm optical entry trocar was placed intra-abdominally under visualization, and pneumoperitoneum was achieved. The abdomen was explored, with findings noted above. An 8 mm robotic port was placed on the right side and an 8 mm robotic port placed in the epigastrium. The patient was placed in Trendelenburg position, the robot was docked and robotic instruments were inserted. Midline adhesions were taken down. The abdomen was explored, with findings noted as above. The peritoneum was incised, and the peritoneal flap was created on the Left. This was carried down towards Alonso's ligament medially and the abdominal side wall laterally. Appropriate landmarks were identified. The cord structures were dissected free of the peritoneum, and the peritoneum was taken down beyond the splaying of the cord. The large defect was plicated with a 3-0 barbed PDS avoiding the nerves. Once dissection was completed, a 10 cm x 15 cm mesh was inserted and set into position. It was secured with a 3-0 PDS suture on Alonso's ligament and an additional suture to the medial anterior abdominal wall. It was noted to be lying appropriately and in good position. Hemostasis was ensured. A 3-0 barbed absorbable suture was used to reapproximate the peritoneum. Next, I turned my attention to the contralateral side. Repair proceeded in a similar fashion with findings as above. Hernia was smaller and did not require plication. Similar mesh was inserted and secured. It was noted to be laying appropriately and in a good position. A 3-0 barbed PDS was used to close the peritoneal defect created at the hernia sac on the right. Once completed, the ports were removed sequentially under visualization, and the pneumoperitoneum was allowed to escape. All skin incisions were closed with subcuticular Monocryl and Dermabond.     The patient remained stable during my presence in the operating room.       Elpidio Bruce MD

## 2021-05-05 NOTE — PERIOP NOTES
Handoff Report from Operating Room to PACU    Report received from [facility] regarding Rhys Rankin. Surgeon(s):  Inga Carlson MD  And Procedure(s) (LRB):  ROBOTIC ASSISTED LAPAROSCOPIC  BILATERAL INGUINAL HERNIA REPAIR WITH MESH (N/A)  confirmed   with allergies and dressings discussed. Anesthesia type, drugs, patient history, complications, estimated blood loss, vital signs, intake and output, and last pain medication, lines and reversal medications were reviewed.

## 2021-05-05 NOTE — INTERVAL H&P NOTE
Update History & Physical    The Patient's History and Physical attached was reviewed with the patient and I examined the patient. There is no change. The surgical site was confirmed by the patient and me. Plan:  The risk, benefits, expected outcome, and alternative to the recommended procedure have been discussed with Jyoti Kristy. He understands and wants to proceed with the procedure. Sites marked.

## 2021-05-05 NOTE — PERIOP NOTES
1862 patient's mom returned Dr. Lemuel Pretty call, Adelaide Lipoma (mom) given post op update. MICA/ Graeme Maria 32 received post op update via cell phone, informed patient awake, pain tolerable, drinking fluids, &  required to void b4 discharge      1035    TRANSFER - OUT REPORT:    Verbal report given to [facility] RN on Wanda Mccarthy  being transferred to phase 2(unit) for routine post - op       Report consisted of patients Situation, Background, Assessment and   Recommendations(SBAR). Information from the following report(s) SBAR, OR Summary, Procedure Summary, Intake/Output, MAR and Cardiac Rhythm NSR was reviewed with the receiving nurse. Opportunity for questions and clarification was provided.       Patient transported with:   Registered Nurse

## 2021-05-05 NOTE — ANESTHESIA PREPROCEDURE EVALUATION
Relevant Problems   No relevant active problems       Anesthetic History   No history of anesthetic complications            Review of Systems / Medical History  Patient summary reviewed, nursing notes reviewed and pertinent labs reviewed    Pulmonary  Within defined limits                 Neuro/Psych   Within defined limits           Cardiovascular  Within defined limits                Exercise tolerance: >4 METS     GI/Hepatic/Renal  Within defined limits              Endo/Other  Within defined limits           Other Findings   Comments: Recurrent left inguinal hernia            Physical Exam    Airway  Mallampati: II  TM Distance: 4 - 6 cm  Neck ROM: normal range of motion   Mouth opening: Normal     Cardiovascular  Regular rate and rhythm,  S1 and S2 normal,  no murmur, click, rub, or gallop             Dental  No notable dental hx       Pulmonary  Breath sounds clear to auscultation               Abdominal  GI exam deferred       Other Findings            Anesthetic Plan    ASA: 1  Anesthesia type: general    Monitoring Plan: BIS      Induction: Intravenous  Anesthetic plan and risks discussed with: Patient

## 2021-05-05 NOTE — DISCHARGE INSTRUCTIONS
Dr. José Beverly Discharge Instructions for:  Brooklynn Sanchez    MRN: [id number] : 1980    Admitted: 2021  Discharged: 2021       What to do at [address]: Resume your usual diet    Recommended activity: Lift less than 10 lbs for 4 weeks. Follow-up with Dr. Simon Botello in 2-3 weeks. Call [phone] for an appointment. Inguinal Hernia Repair: What to Expect at [address] are likely to have pain for the next few days. You may also feel like you have the flu, and you may have a low fever and feel tired and nauseated. This is common. You should feel better after a few days and will probably feel much better in 7 days. For several weeks you may feel twinges or pulling in the hernia repair when you move. You may have some bruising on the scrotum and along the penis. This is normal.    Men will need to wear a jockstrap or briefs, not boxers, for scrotal support for several days after a groin (inguinal) hernia repair. [employer] bicycle shorts may provide good support. This care sheet gives you a general idea about how long it will take for you to recover. But each person recovers at a different pace. Follow the steps below to get better as quickly as possible. How can you care for yourself at home? Activity  Rest when you feel tired. Getting enough sleep will help you recover. You can try to sleep with your head up in a recliner chair if that is more comfortable. Try to walk each day. Start by walking a little more than you did the day before. Bit by bit, increase the amount you walk. Walking boosts blood flow and helps prevent pneumonia and constipation. Put ice or a cold pack on the area of your hernia repair for 10 to 15 minutes at a time. Try to do this every 1 to 2 hours for the first 24 hours (when you are awake) or until the swelling goes down. Put a thin cloth between the ice and your skin.   Avoid strenuous activities, such as biking, jogging, weight lifting, or aerobic exercise, until your doctor says it is okay. Avoid lifting anything that would make you strain. This may include heavy grocery bags and milk containers, a heavy briefcase or backpack, cat litter or dog food bags, a child, or a vacuum . You may drive when you are no longer taking pain medicine and can quickly move your foot from the gas pedal to the brake. You must also be able to sit comfortably for a long period of time. Most people are able to return to work within 1 to 2 weeks after surgery. You may shower. Pat the cut (incision) dry. Do not take a bath for 2 weeks. You can have sex again when it feels comfortable to do so. Diet  You can eat your normal diet. If your stomach is upset, try bland, low-fat foods like plain rice, broiled chicken, toast, and yogurt. Drink plenty of fluids (unless your doctor tells you not to). You may notice that your bowel movements are not regular right after your surgery. This is common. Avoid constipation and straining with bowel movements. You may want to take a fiber supplement every day. If you have not had a bowel movement after a couple of days, ask your doctor about taking a mild laxative. Medicines  Take pain medicines exactly as directed. If the doctor gave you a prescription medicine for pain, take it as prescribed. If you are not taking a prescription pain medicine, take an over-the-counter medicine such as acetaminophen (Tylenol), ibuprofen (Advil, Motrin), or naproxen (Aleve). Read and follow all instructions on the label. Do not take two or more pain medicines at the same time unless the doctor told you to. Many pain medicines have acetaminophen, which is Tylenol. Too much acetaminophen (Tylenol) can be harmful. If your doctor prescribed antibiotics, take them as directed. Do not stop taking them just because you feel better. You need to take the full course of antibiotics.   If you think your pain medicine is making you sick to your stomach: Take your medicine after meals (unless your doctor has told you not to). Ask your doctor for a different pain medicine. Incision care  If you have strips of tape on the cut (incision) the doctor made, leave the tape on for a week or until it falls off. If you have staples closing the cut, you will need to visit your doctor in 1 to 2 weeks to have them removed. Wash the area daily with warm, soapy water and pat it dry. Follow-up care is a key part of your treatment and safety. Be sure to make and go to all appointments, and call your doctor if you are having problems. Its also a good idea to know your test results and keep a list of the medicines you take. When should you call for help? Call 911 anytime you think you may need emergency care. For example, call if:  You pass out (lose consciousness). You have sudden chest pain and shortness of breath, or you cough up blood. You have severe pain in your belly. Call your doctor now or seek immediate medical care if:  You are sick to your stomach and cannot keep fluids down. You have signs of a blood clot, such as:  Pain in your calf, back of the knee, thigh, or groin. Redness and swelling in your leg or groin. You have trouble passing urine or stool, especially if you have mild pain or swelling in your lower belly. You have hot flashes, sweating, flushing, or a fast or pounding heartbeat. Bright red blood has soaked through the bandage over your incision. Watch closely for any changes in your health, and be sure to contact your doctor if:  Your swelling is getting worse. Your swelling is not going down.         DISCHARGE SUMMARY from Nurse    PATIENT INSTRUCTIONS:    After general anesthesia or intravenous sedation, for 24 hours or while taking prescription Narcotics:  · Limit your activities  · Do not drive and operate hazardous machinery  · Do not make important personal or business decisions  · Do  not drink alcoholic beverages  · If you have not urinated within 8 hours after discharge, please contact your surgeon on call. Report the following to your surgeon:  · Excessive pain, swelling, redness or odor of or around the surgical area  · Temperature over 100.5  · Nausea and vomiting lasting longer than 4 hours or if unable to take medications  · Any signs of decreased circulation or nerve impairment to extremity: change in color, persistent  numbness, tingling, coldness or increase pain  · Any questions    What to do at Home:      Surgeon General's Warning:  Quitting smoking now greatly reduces serious risk to your health. Obesity, smoking, and sedentary lifestyle greatly increases your risk for illness    A healthy diet, regular physical exercise & weight monitoring are important for maintaining a healthy lifestyle    You may be retaining fluid if you have a history of heart failure or if you experience any of the following symptoms:  Weight gain of 3 pounds or more overnight or 5 pounds in a week, increased swelling in our hands or feet or shortness of breath while lying flat in bed. Please call your doctor as soon as you notice any of these symptoms; do not wait until your next office visit. Patient Education   Patient Education   Patient Education      Ondansetron (Zofran, Zofran ODT, Susan Mejia) - (By mouth, Into the mouth)   Why this medicine is used:   Prevents nausea and vomiting. Contact a nurse or doctor right away if you have:  · Fast, pounding, or uneven heartbeat  · Lightheadedness or fainting  · Trouble breathing     Common side effects:  · Headache, tiredness  · Constipation, diarrhea  © 2017 [facility] Information is for End User's use only and may not be sold, redistributed or otherwise used for commercial purposes. Ibuprofen (Advil, Advil Children's, Motrin, Children's Ibuprofen) - (By mouth)   Why this medicine is used:   Treats pain and fever. This medicine is an NSAID.   Contact a nurse or doctor right away if you have:  · Change in how much or how often you urinate  · Severe stomach pain, vomiting blood, bloody or black tarry stools  · Swelling in your hands, ankles, or feet; rapid weight gain     Common side effects:  · Constipation, diarrhea, gas, mild upset stomach  · Ringing in your ears, dizziness, headache  © 2017 [address] is for End User's use only and may not be sold, redistributed or otherwise used for commercial purposes. Oxycodone, Rapid Release (ETH-Oxydose, Oxy IR, Roxicodone) - (By mouth)   Why this medicine is used:   Treats moderate to severe pain. This medicine is a narcotic pain reliever. Contact a nurse or doctor right away if you have:  · Fast or slow heart beat, shallow breathing, blue lips, skin or fingernails  · Anxiety, restlessness, fever, sweating, muscle spasms, twitching, seeing or hearing things that are not there  · Extreme weakness, shallow breathing, slow heartbeat  · Severe confusion, lightheadedness, dizziness, fainting  · Sweating or cold, clammy skin, seizures  · Severe constipation, stomach pain, nausea, vomiting     Common side effects:  · Mild constipation  · Sleepiness, tiredness  © 2017 [address] Information is for End User's use only and may not be sold, redistributed or otherwise used for commercial purposes. The discharge information has been reviewed with the {PATIENT PARENT GUARDIAN:78485}. The {PATIENT PARENT GUARDIAN:76090} verbalized understanding. Discharge medications reviewed with the {Dishcarge meds reviewed [facility]:14654} and appropriate educational materials and side effects teaching were provided.   ___________________________________________________________________________________________________________________________________

## 2021-05-05 NOTE — ANESTHESIA POSTPROCEDURE EVALUATION
Procedure(s):  ROBOTIC ASSISTED LAPAROSCOPIC  BILATERAL INGUINAL HERNIA REPAIR WITH MESH. general    Anesthesia Post Evaluation        Patient location during evaluation: PACU  Note status: Adequate. Level of consciousness: responsive to verbal stimuli and sleepy but conscious  Pain management: satisfactory to patient  Airway patency: patent  Anesthetic complications: no  Cardiovascular status: acceptable  Respiratory status: acceptable  Hydration status: acceptable  Comments: +Post-Anesthesia Evaluation and Assessment    Patient: Hollie Pretty MRN: [id number]  SSN: [id number]   YOB: 1980  Age: 38 y.o. Sex: male      Cardiovascular Function/Vital Signs    /66   Pulse 69   Temp 36.8 °C (98.2 °F)   Resp 11   Ht 5' 10\" (1.778 m)   Wt 78.8 kg (173 lb 11.6 oz)   SpO2 95%   BMI 24.93 kg/m²     Patient is status post Procedure(s):  ROBOTIC ASSISTED LAPAROSCOPIC  BILATERAL INGUINAL HERNIA REPAIR WITH MESH. Nausea/Vomiting: Controlled. Postoperative hydration reviewed and adequate. Pain:  Pain Scale 1: Numeric (0 - 10) (05/05/21 1015)  Pain Intensity 1: 3 (05/05/21 1015)   Managed. Neurological Status:   Neuro (WDL): Within Defined Limits (05/05/21 0643)   At baseline. Mental Status and Level of Consciousness: Arousable. Pulmonary Status:   O2 Device: None (Room air) (05/05/21 1010)   Adequate oxygenation and airway patent. Complications related to anesthesia: None    Post-anesthesia assessment completed. No concerns. Signed By: Carolene Cogan, MD    5/5/2021  Post anesthesia nausea and vomiting:  controlled      INITIAL Post-op Vital signs:   Vitals Value Taken Time   /70 05/05/21 1030   Temp 36.8 °C (98.2 °F) 05/05/21 0939   Pulse 47 05/05/21 1040   Resp 9 05/05/21 1040   SpO2 98 % 05/05/21 1040   Vitals shown include unvalidated device data.

## 2021-05-20 ENCOUNTER — OFFICE VISIT (OUTPATIENT)
Dept: SURGERY | Age: 41
End: 2021-05-20
Payer: COMMERCIAL

## 2021-05-20 VITALS
OXYGEN SATURATION: 98 % | BODY MASS INDEX: 24.48 KG/M2 | SYSTOLIC BLOOD PRESSURE: 108 MMHG | WEIGHT: 171 LBS | HEIGHT: 70 IN | DIASTOLIC BLOOD PRESSURE: 62 MMHG | TEMPERATURE: 97.5 F | HEART RATE: 65 BPM | RESPIRATION RATE: 16 BRPM

## 2021-05-20 DIAGNOSIS — Z09 POSTOPERATIVE EXAMINATION: Primary | ICD-10-CM

## 2021-05-20 PROCEDURE — 99024 POSTOP FOLLOW-UP VISIT: CPT | Performed by: SURGERY

## 2021-05-20 NOTE — Clinical Note
5/20/2021 Patient: Linnette Rodriguez YOB: 1980 Date of Visit: 5/20/2021 Ninfa Hashimoto, MD 
[address] [place] [address] [employer] Dear Ninfa Hashimoto, MD, Thank you for referring Mr. Linnette Rodriguez to [address] [place], [place] [address]  for evaluation. My notes for this consultation are attached. If you have questions, please do not hesitate to call me. I look forward to following your patient along with you. Sincerely, Abdulkadir Nice MD

## 2021-05-20 NOTE — PROGRESS NOTES
Chief Complaint   Patient presents with    Surgical Follow-up     ROBOTIC ASSISTED LAPAROSCOPIC  BILATERAL INGUINAL HERNIA REPAIR WITH MESH 5/5       Tolerating PO  Pain controlled  Had some swelling , gone now  Taking no pain meds      Physical Exam:   Abdominal exam: soft  non-distended  appropriatly tender.   Wound: clean, dry, no drainage    Doing well  Continue restricted activity for a total of 4 weeks  Follow-up: refugio Urban MD FACS

## 2021-09-12 ENCOUNTER — APPOINTMENT (OUTPATIENT)
Dept: GENERAL RADIOLOGY | Age: 41
End: 2021-09-12
Attending: EMERGENCY MEDICINE
Payer: COMMERCIAL

## 2021-09-12 ENCOUNTER — HOSPITAL ENCOUNTER (EMERGENCY)
Age: 41
Discharge: HOME | End: 2021-09-12
Attending: STUDENT IN AN ORGANIZED HEALTH CARE EDUCATION/TRAINING PROGRAM
Payer: COMMERCIAL

## 2021-09-12 VITALS
OXYGEN SATURATION: 100 % | TEMPERATURE: 98.2 F | RESPIRATION RATE: 14 BRPM | DIASTOLIC BLOOD PRESSURE: 80 MMHG | HEIGHT: 70 IN | HEART RATE: 66 BPM | SYSTOLIC BLOOD PRESSURE: 138 MMHG | WEIGHT: 169.09 LBS | BODY MASS INDEX: 24.21 KG/M2

## 2021-09-12 DIAGNOSIS — S62.357A CLOSED NONDISPLACED FRACTURE OF SHAFT OF FIFTH METACARPAL BONE OF LEFT HAND, INITIAL ENCOUNTER: Primary | ICD-10-CM

## 2021-09-12 PROCEDURE — 99283 EMERGENCY DEPT VISIT LOW MDM: CPT

## 2021-09-12 PROCEDURE — 73130 X-RAY EXAM OF HAND: CPT

## 2021-09-12 PROCEDURE — 75810000053 HC SPLINT APPLICATION

## 2021-09-12 PROCEDURE — 73110 X-RAY EXAM OF WRIST: CPT

## 2021-09-12 PROCEDURE — 74011250637 HC RX REV CODE- 250/637: Performed by: STUDENT IN AN ORGANIZED HEALTH CARE EDUCATION/TRAINING PROGRAM

## 2021-09-12 RX ORDER — OXYCODONE AND ACETAMINOPHEN 5; 325 MG/1; MG/1
1 TABLET ORAL
Qty: 12 TABLET | Refills: 0 | Status: SHIPPED | OUTPATIENT
Start: 2021-09-12 | End: 2021-09-15

## 2021-09-12 RX ORDER — OXYCODONE AND ACETAMINOPHEN 5; 325 MG/1; MG/1
1 TABLET ORAL
Status: COMPLETED | OUTPATIENT
Start: 2021-09-12 | End: 2021-09-12

## 2021-09-12 RX ADMIN — OXYCODONE HYDROCHLORIDE AND ACETAMINOPHEN 1 TABLET: 5; 325 TABLET ORAL at 14:54

## 2021-09-12 NOTE — DISCHARGE INSTRUCTIONS
It was a pleasure taking care of you at [facility] today. We know that when you come to [facility], you are entrusting us with your health, comfort, and safety. Our physicians and nurses honor that trust, and we truly appreciate the opportunity to care for you and your loved ones. We also value your feedback. If you receive a survey about your Emergency Department experience today, please fill it out. We care about our patients' feedback, and we listen to what you have to say. Thank you!     Mauricia Habermann, MD
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
## 2022-03-18 PROBLEM — D33.4 SCHWANNOMA OF SPINAL CORD (HCC): Status: ACTIVE | Noted: 2019-06-07

## 2022-03-19 PROBLEM — F41.9 ANXIETY: Status: ACTIVE | Noted: 2019-06-07

## 2022-06-24 ENCOUNTER — OFFICE VISIT (OUTPATIENT)
Dept: FAMILY MEDICINE CLINIC | Age: 42
End: 2022-06-24
Payer: COMMERCIAL

## 2022-06-24 VITALS
HEART RATE: 63 BPM | RESPIRATION RATE: 14 BRPM | OXYGEN SATURATION: 96 % | TEMPERATURE: 97.5 F | BODY MASS INDEX: 26.22 KG/M2 | SYSTOLIC BLOOD PRESSURE: 105 MMHG | WEIGHT: 177 LBS | HEIGHT: 69 IN | DIASTOLIC BLOOD PRESSURE: 68 MMHG

## 2022-06-24 DIAGNOSIS — R73.09 ABNORMAL GLUCOSE: ICD-10-CM

## 2022-06-24 DIAGNOSIS — Z00.00 ANNUAL PHYSICAL EXAM: Primary | ICD-10-CM

## 2022-06-24 DIAGNOSIS — H91.91 DECREASED HEARING OF RIGHT EAR: ICD-10-CM

## 2022-06-24 DIAGNOSIS — E78.2 MIXED HYPERLIPIDEMIA: ICD-10-CM

## 2022-06-24 LAB
ANION GAP SERPL CALC-SCNC: 5 MMOL/L (ref 5–15)
BUN SERPL-MCNC: 11 MG/DL (ref 6–20)
BUN/CREAT SERPL: 12 (ref 12–20)
CALCIUM SERPL-MCNC: 9.2 MG/DL (ref 8.5–10.1)
CHLORIDE SERPL-SCNC: 105 MMOL/L (ref 97–108)
CHOLEST SERPL-MCNC: 188 MG/DL
CO2 SERPL-SCNC: 28 MMOL/L (ref 21–32)
CREAT SERPL-MCNC: 0.92 MG/DL (ref 0.7–1.3)
GLUCOSE SERPL-MCNC: 98 MG/DL (ref 65–100)
HDLC SERPL-MCNC: 65 MG/DL
HDLC SERPL: 2.9 {RATIO} (ref 0–5)
LDLC SERPL CALC-MCNC: 114.6 MG/DL (ref 0–100)
POTASSIUM SERPL-SCNC: 4.5 MMOL/L (ref 3.5–5.1)
SODIUM SERPL-SCNC: 138 MMOL/L (ref 136–145)
TRIGL SERPL-MCNC: 42 MG/DL (ref ?–150)
VLDLC SERPL CALC-MCNC: 8.4 MG/DL

## 2022-06-24 PROCEDURE — 99396 PREV VISIT EST AGE 40-64: CPT | Performed by: STUDENT IN AN ORGANIZED HEALTH CARE EDUCATION/TRAINING PROGRAM

## 2022-06-24 NOTE — PROGRESS NOTES
[address]  [address] [place], [address] [place] [address]  [phone]    C/C: Complete physical    Subjective:    Lucy Meraz is a 42 y.o. male who presents to clinic today for annual physical exam.      Acute/chronic Concerns include:   Noticed gradual hypersensitive to high sounds from right ear. 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 Has always worn ear protections. No trauma. Hearing is overall ok. No drainage. Now Works for a [employer]. Health Maintenance reviewed -     Health Maintenance Due   Topic Date Due    Hepatitis C Screening  Never done    Depression Screen  Never done    COVID-19 Vaccine (1) Never done       Review of Systems   A comprehensive review of systems was negative except for that written in the History of Present Illness. Allergies- reviewed:   No Known Allergies    Medications- reviewed:   Current Outpatient Medications   Medication Sig    fluticasone propionate (Flonase Allergy Relief) 50 mcg/actuation nasal spray 2 Sprays by Both Nostrils route daily. No current facility-administered medications for this visit.        Past Medical History- reviewed:  Past Medical History:   Diagnosis Date    Schwannoma of spinal cord ([facility] [address] 2014       Past Surgical History- reviewed:   Past Surgical History:   Procedure Laterality Date    HX APPENDECTOMY      HX HERNIA REPAIR  [id number]    inguinal hernia repairs (bilateral)    HX HERNIA REPAIR  05/05/2021    ROBOTIC ASSISTED LAPAROSCOPIC  BILATERAL INGUINAL HERNIA REPAIR WITH MESH    NEUROLOGICAL PROCEDURE UNLISTED  03/2015    Schwanoma removal behind bladder       Family History - reviewed:  Family History   Problem Relation Age of Onset    Thyroid Disease Mother         hyperthyroidism    COPD Mother     No Known Problems Father     Cancer Maternal Grandmother         bladder cancer       Social History - reviewed:  Social History     Socioeconomic History    Marital status: SINGLE     Spouse name: Not on file    Number of children: Not on file    Years of education: Not on file    Highest education level: Not on file   Occupational History    Not on file   Tobacco Use    Smoking status: Never Smoker    Smokeless tobacco: Never Used   Vaping Use    Vaping Use: Never used   Substance and Sexual Activity    Alcohol use: Yes     Comment: social    Drug use: Yes     Types: Marijuana     Comment: 1x every 6 months    Sexual activity: Yes     Partners: Female     Birth control/protection: Condom   Other Topics Concern    Not on file   Social History Narrative    Engaged, 2 children, 19 and 11. Oldest goes to college in [place]. Works for [employer] [place] [address] as a      Social Determinants of Health     Financial Resource Strain:     Difficulty of Paying Living Expenses: Not on file   Food Insecurity:     Worried About [address] in the Last Year: Not on file    [address] in the Last Year: Not on file   Transportation Needs:     Lack of Transportation (Medical): Not on file    Lack of Transportation (Non-Medical):  Not on file   Physical Activity:     Days of Exercise per Week: Not on file    Minutes of Exercise per Session: Not on file   Stress:     Feeling of Stress : Not on file   Social Connections:     Frequency of Communication with Friends and Family: Not on file    Frequency of Social Gatherings with Friends and Family: Not on file    Attends Yazidi Services: Not on file    Active Member of Clubs or Organizations: Not on file    Attends Club or Organization Meetings: Not on file    Marital Status: Not on file   Intimate Partner Violence:     Fear of Current or Ex-Partner: Not on file    Emotionally Abused: Not on file    Physically Abused: Not on file    Sexually Abused: Not on file   Housing Stability:     Unable to Pay for Housing in the Last Year: Not on file    Number of Places Lived in the Last Year: Not on file    Unstable Housing in the Last Year: Not on file         Objective:     Visit Vitals  /68   Pulse 63   Temp 97.5 °F (36.4 °C) (Oral)   Resp 14   Ht 5' 8.5\" (1.74 m)   Wt 177 lb (80.3 kg)   SpO2 96%   BMI 26.52 kg/m²       General appearance: alert, cooperative, no distress, appears stated age  Head: Normocephalic, without obvious abnormality, atraumatic, sinuses nontender to percussion  Eyes: conjunctivae/corneas clear. PERRL, EOM's intact. Ears: normal TM's and external ear canals AU  Nose: Nares normal. Septum midline. Mucosa normal. No drainage or sinus tenderness. Throat: Lips, mucosa, and tongue normal. Teeth and gums normal  Neck: supple, symmetrical, trachea midline, no adenopathy, thyroid: not enlarged, symmetric, no tenderness/mass/nodules, no carotid bruit and no JVD  Back: symmetric, no curvature. ROM normal. No CVA tenderness. Lungs: clear to auscultation bilaterally, no wheezes, no increased work of breathing  Heart: regular rate and rhythm, S1, S2 normal, no murmur, click, rub or gallop  Abdomen: soft, non-tender. Bowel sounds normal. No masses,  no organomegaly  Extremities: extremities normal, atraumatic, no cyanosis or edema  Pulses: 2+ and symmetric  Skin: Skin color, texture, turgor normal. No rashes or lesions  Lymph nodes: Cervical, supraclavicular, and axillary nodes normal.  MSK: FROM in all extremities without any pain  Neurologic: Alert and oriented X 3, normal strength and tone. Normal symmetric reflexes.  Normal coordination and gait    Depression screening:  3 most recent PHQ Screens 6/24/2022   Little interest or pleasure in doing things Not at all   Feeling down, depressed, irritable, or hopeless Not at all   Total Score PHQ 2 0   Trouble falling or staying asleep, or sleeping too much Not at all   Poor appetite, weight loss, or overeating Not at all   Feeling bad about yourself - or that you are a failure or have let yourself or your family down Not at all   Trouble concentrating on things such as school, work, reading, or watching TV Not at all   Moving or speaking so slowly that other people could have noticed; or the opposite being so fidgety that others notice Not at all   Thoughts of being better off dead, or hurting yourself in some way Not at all   How difficult have these problems made it for you to do your work, take care of your home and get along with others Not difficult at all           Assessment/Plan       ICD-10-CM ICD-9-CM    1. Annual physical exam  Z00.00 V70.0    2. Mixed hyperlipidemia  E78.2 272.2 LIPID PANEL   3. Abnormal glucose  V08.01 375.73 METABOLIC PANEL, BASIC   4. Decreased hearing of right ear  H91.91 389.9 REFERRAL TO ENT-OTOLARYNGOLOGY         1. Annual physical exam  I have discussed with pt the following topics:   - Discussed with patient the cancer risk factors and recommendations  - Reviewed diet, exercise and weight control  -Immunizations appropriate for age were discussed with patient and updated   - Recommended sodium restriction  - Reviewed medications and side effects in detail      2. Mixed hyperlipidemia  - LIPID PANEL; Future    3. Abnormal glucose  - METABOLIC PANEL, BASIC; Future    4. Decreased hearing of right ear  Exam is unremarkable. - REFERRAL TO ENT-OTOLARYNGOLOGY    Follow up: 1 year. RTC if needed    We discussed the expected course, resolution and complications of the diagnosis(es) in detail. Medication risks, benefits, costs, interactions, and alternatives were discussed as indicated. I advised to contact the office if his condition worsens, changes or fails to improve as anticipated. Pt expressed understanding with the diagnosis(es) and plan. Patient understands that this encounter was a temporary measure, and the importance of further follow up and examination was emphasized. Patient verbalized understanding.       Signed By: MD Lizz Arias 24, 2022

## 2022-06-24 NOTE — PATIENT INSTRUCTIONS
1) [facility]     Dr. Dheeraj Feliciano [address], [address],  [place], [address]    2) [facility] (multiple locations)    [address] [place] [address] [facility]  [phone]    3) Dr. Marlene Damon  [address] [address] [phone]      4)[facility] and [address];    Dr. Ashutosh Allen Or    [address] [facility], [address] [facility]    [phone]

## 2022-06-24 NOTE — PROGRESS NOTES
Chief Complaint   Patient presents with    Complete Physical     1. Have you been to the ER, urgent care clinic since your last visit? Hospitalized since your last visit? No    2. Have you seen or consulted any other health care providers outside of the [address] [place] [address] since your last visit? Include any pap smears or colon screening. No     Health Maintenance   Topic Date Due    Hepatitis C Screening  Never done    Depression Screen  Never done    COVID-19 Vaccine (1) Never done    Flu Vaccine (Season Ended) 09/01/2022    Lipid Screen  06/07/2024    DTaP/Tdap/Td series (2 - Td or Tdap) 08/25/2028    Pneumococcal 0-64 years  Aged Out     verified patient with two type of identifiers.   denies c/o at present

## 2023-10-30 ENCOUNTER — TELEPHONE (OUTPATIENT)
Age: 43
End: 2023-10-30

## 2023-10-30 NOTE — TELEPHONE ENCOUNTER
Patient would like to be seen soon regarding severe abdominal pain please give him a call @ [phone]

## 2023-11-02 ENCOUNTER — OFFICE VISIT (OUTPATIENT)
Age: 43
End: 2023-11-02
Payer: COMMERCIAL

## 2023-11-02 VITALS
RESPIRATION RATE: 16 BRPM | WEIGHT: 175.2 LBS | BODY MASS INDEX: 25.95 KG/M2 | SYSTOLIC BLOOD PRESSURE: 107 MMHG | HEART RATE: 55 BPM | DIASTOLIC BLOOD PRESSURE: 64 MMHG | OXYGEN SATURATION: 99 % | TEMPERATURE: 97.8 F | HEIGHT: 69 IN

## 2023-11-02 DIAGNOSIS — R10.30 LOWER ABDOMINAL PAIN: Primary | ICD-10-CM

## 2023-11-02 DIAGNOSIS — A08.4 VIRAL GASTROENTERITIS: ICD-10-CM

## 2023-11-02 PROCEDURE — 99213 OFFICE O/P EST LOW 20 MIN: CPT | Performed by: FAMILY MEDICINE

## 2023-11-02 SDOH — ECONOMIC STABILITY: INCOME INSECURITY: HOW HARD IS IT FOR YOU TO PAY FOR THE VERY BASICS LIKE FOOD, HOUSING, MEDICAL CARE, AND HEATING?: NOT HARD AT ALL

## 2023-11-02 SDOH — ECONOMIC STABILITY: FOOD INSECURITY: WITHIN THE PAST 12 MONTHS, THE FOOD YOU BOUGHT JUST DIDN'T LAST AND YOU DIDN'T HAVE MONEY TO GET MORE.: NEVER TRUE

## 2023-11-02 SDOH — ECONOMIC STABILITY: FOOD INSECURITY: WITHIN THE PAST 12 MONTHS, YOU WORRIED THAT YOUR FOOD WOULD RUN OUT BEFORE YOU GOT MONEY TO BUY MORE.: NEVER TRUE

## 2023-11-02 SDOH — ECONOMIC STABILITY: HOUSING INSECURITY
IN THE LAST 12 MONTHS, WAS THERE A TIME WHEN YOU DID NOT HAVE A STEADY PLACE TO SLEEP OR SLEPT IN A SHELTER (INCLUDING NOW)?: NO

## 2023-11-02 NOTE — PROGRESS NOTES
Chief Complaint   Patient presents with    Abdominal Pain     Lower center pain for a week, better but gas now       1. Have you been to the ER, urgent care clinic since your last visit? Hospitalized since your last visit? No    2. Have you seen or consulted any other health care providers outside of the [address] [place] [address] Avenue since your last visit? Include any pap smears or colon screening.  No

## (undated) DEVICE — 3M™ IOBAN™ 2 ANTIMICROBIAL INCISE DRAPE 6651EZ: Brand: IOBAN™ 2

## (undated) DEVICE — REM POLYHESIVE ADULT PATIENT RETURN ELECTRODE: Brand: VALLEYLAB

## (undated) DEVICE — BLADELESS OBTURATOR: Brand: WECK VISTA

## (undated) DEVICE — SUTURE MCRYL SZ 4-0 L27IN ABSRB UD L19MM PS-2 1/2 CIR PRIM Y426H

## (undated) DEVICE — SOL IRR STRL H2O 1000ML BTL --

## (undated) DEVICE — PREP SKN CHLRAPRP APL 26ML STR --

## (undated) DEVICE — TOWEL SURG W17XL27IN STD BLU COT NONFENESTRATED PREWASHED

## (undated) DEVICE — SEAL UNIV 5-8MM DISP BX/10 -- DA VINCI XI - SNGL USE

## (undated) DEVICE — COVER,MAYO STAND,STERILE: Brand: MEDLINE

## (undated) DEVICE — SUT STRATA PDS+ 15CM SZ 3-0 SH -- STRATAFIX

## (undated) DEVICE — STRAP,POSITIONING,KNEE/BODY,FOAM,4X60": Brand: MEDLINE

## (undated) DEVICE — SURGICAL PROCEDURE KIT GEN LAPAROSCOPY LF

## (undated) DEVICE — DRAPE,REIN 53X77,STERILE: Brand: MEDLINE

## (undated) DEVICE — COVER MPLR TIP CRV SCIS ACC DA VINCI

## (undated) DEVICE — GARMENT,MEDLINE,DVT,INT,CALF,MED, GEN2: Brand: MEDLINE

## (undated) DEVICE — INFECTION CONTROL KIT SYS

## (undated) DEVICE — SUTURE VCRL SZ 3-0 L27IN ABSRB UD L26MM SH 1/2 CIR J416H

## (undated) DEVICE — VISUALIZATION SYSTEM: Brand: CLEARIFY

## (undated) DEVICE — DERMABOND SKIN ADH 0.7ML -- DERMABOND ADVANCED 12/BX

## (undated) DEVICE — NEEDLE HYPO 22GA L1.5IN BLK S STL HUB POLYPR SHLD REG BVL

## (undated) DEVICE — ARM DRAPE